# Patient Record
Sex: MALE | Race: WHITE | NOT HISPANIC OR LATINO | Employment: FULL TIME | URBAN - METROPOLITAN AREA
[De-identification: names, ages, dates, MRNs, and addresses within clinical notes are randomized per-mention and may not be internally consistent; named-entity substitution may affect disease eponyms.]

---

## 2019-03-08 ENCOUNTER — OFFICE VISIT (OUTPATIENT)
Dept: FAMILY MEDICINE CLINIC | Facility: CLINIC | Age: 54
End: 2019-03-08
Payer: COMMERCIAL

## 2019-03-08 VITALS
BODY MASS INDEX: 29.42 KG/M2 | DIASTOLIC BLOOD PRESSURE: 78 MMHG | TEMPERATURE: 97.9 F | RESPIRATION RATE: 18 BRPM | OXYGEN SATURATION: 95 % | HEART RATE: 73 BPM | SYSTOLIC BLOOD PRESSURE: 132 MMHG | WEIGHT: 205.5 LBS | HEIGHT: 70 IN

## 2019-03-08 DIAGNOSIS — Z00.00 ROUTINE ADULT HEALTH MAINTENANCE: Primary | ICD-10-CM

## 2019-03-08 DIAGNOSIS — Z12.11 SCREEN FOR COLON CANCER: ICD-10-CM

## 2019-03-08 DIAGNOSIS — E66.3 OVERWEIGHT (BMI 25.0-29.9): ICD-10-CM

## 2019-03-08 DIAGNOSIS — Z11.1 SCREENING EXAMINATION FOR PULMONARY TUBERCULOSIS: ICD-10-CM

## 2019-03-08 DIAGNOSIS — R39.12 BENIGN PROSTATIC HYPERPLASIA WITH WEAK URINARY STREAM: ICD-10-CM

## 2019-03-08 DIAGNOSIS — N40.1 BENIGN PROSTATIC HYPERPLASIA WITH WEAK URINARY STREAM: ICD-10-CM

## 2019-03-08 PROBLEM — H35.52 RETINITIS PIGMENTOSA: Status: ACTIVE | Noted: 2019-03-08

## 2019-03-08 LAB — PSA SERPL-MCNC: 1 NG/ML (ref 0–4)

## 2019-03-08 PROCEDURE — 84153 ASSAY OF PSA TOTAL: CPT | Performed by: PHYSICIAN ASSISTANT

## 2019-03-08 PROCEDURE — 36415 COLL VENOUS BLD VENIPUNCTURE: CPT | Performed by: PHYSICIAN ASSISTANT

## 2019-03-08 PROCEDURE — 99386 PREV VISIT NEW AGE 40-64: CPT | Performed by: PHYSICIAN ASSISTANT

## 2019-03-08 PROCEDURE — 86580 TB INTRADERMAL TEST: CPT | Performed by: PHYSICIAN ASSISTANT

## 2019-03-08 NOTE — PROGRESS NOTES
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   This is completed and pending PPD result up front in the brown accordion folder  Age appropriate education and prevention discussed with patient today  Colonoscopy referral given  He also has a history of BPH and states that he still has a slightly weak stream but no other new urinary symptoms  He does report having screening blood work including lipids and sugar in the earlier winter months and states they looked good with total cholesterol 147 but he does not recall any other numbers  He will provide us with a copy of the screening blood work results for his file  With him having BPH I did recommend that we check a PSA and blood work was collected for that today we will call him with results  He does need a PPD test which was administered today and he will return in 48-72 hours for a PPD read  Educations regarding BMI was discussed today and he is considered overweight with a BMI of 29  Discussed exercise and dietary modification however unfortunately patient has been dealing with Achilles issues so he is limited to what he can do regarding exercise  We discussed the importance of portion control as well as avoiding eating out and preparing meals from home as well as limiting saturated fats and incorporating more healthy fats as seen in fish, nuts, avocado and a whites    He is compliant with his eye exams as he sees [facility] for retinitis pigmentosa as well as dental cleanings every 6 months  He also will continue following with [facility] for yearly skin checks  Follow-up in 1 year for annual physical, sooner with any concerns  Chief Complaint   Patient presents with    Physical Exam     Pt presents for an annual physical for his employment  Subjective:     Patient ID: Gloria Longoria is a 48 y o  male     53y/o male here today for annual physical and school  Follows with [facility] for RP  He also has seen Dr Janey Recio at [facility] for achilles issues  Visits dentist for cleanings q 6 months  [facility] for eye surveillance  Eats 3-4 meals/day, smaller portions, eats out average once a week but lunch meetings which is pizza/subs  He takes luetine and Vit D  Drinks water (approx 2 quarts/day) and occasional monster energy, tea  He plays volleyball, but no consistent exercise otherwise  He denies decreased libido, states does have mild difficulty with erection, no issues with ejaculation  He has hx of BPH, reduced urine stream, but no urinary frequency or nocturia  Denies FHx of prostate or testicular CA with exception of paternal uncle of prostate CA  Denies FHx of colon CA  Smoke detector in home, wears seatbelt  [facility]  Father with hx of skin CA  Review of Systems   Constitutional: Negative  HENT: Negative  Eyes:        As in HPI   Respiratory: Negative  Cardiovascular: Negative  Gastrointestinal: Negative  Endocrine: Negative  Genitourinary:        As in HPI   Musculoskeletal: Negative  Skin: Negative  Neurological: Negative  Psychiatric/Behavioral: Negative            The following portions of the patient's history were reviewed and updated as appropriate: allergies, current medications, past family history, past medical history, past social history, past surgical history and problem list       Objective:     Physical Exam   Constitutional: He is oriented to person, place, and time  Vital signs are normal  He appears well-developed and well-nourished  He does not appear ill  No distress  Overweight BMI 29   HENT:   Head: Normocephalic  Right Ear: Hearing, tympanic membrane and ear canal normal    Left Ear: Hearing, tympanic membrane and ear canal normal    Nose: Nose normal    Mouth/Throat: Oropharynx is clear and moist    Eyes: Pupils are equal, round, and reactive to light  Conjunctivae, EOM and lids are normal    Neck: Trachea normal and normal range of motion  Neck supple  Normal carotid pulses present  Carotid bruit is not present  No thyroid mass present  Cardiovascular: Normal rate, regular rhythm, S1 normal, S2 normal and normal pulses  No murmur heard  Pulmonary/Chest: Effort normal and breath sounds normal    Abdominal: Soft  Normal appearance, normal aorta and bowel sounds are normal  There is no tenderness  Musculoskeletal:   Gait normal   Normal movement and strength in neck, spine and extremities B/L   Lymphadenopathy:     He has no cervical adenopathy  Neurological: He is alert and oriented to person, place, and time  No cranial nerve deficit or sensory deficit  Reflex Scores:       Brachioradialis reflexes are 1+ on the right side and 1+ on the left side  Patellar reflexes are 1+ on the right side and 1+ on the left side  Skin: Skin is intact  Pt has fair skin, numerous nevi all over body  Psychiatric: He has a normal mood and affect  His behavior is normal  Cognition and memory are normal    Vitals reviewed  Vitals:    03/08/19 0838   BP: 132/78   BP Location: Left arm   Patient Position: Sitting   Cuff Size: Adult   Pulse: 73   Resp: 18   Temp: 97 9 °F (36 6 °C)   TempSrc: Tympanic   SpO2: 95%   Weight: 93 2 kg (205 lb 8 oz)   Height: 5' 10 47" (1 79 m)     BMI Counseling: Body mass index is 29 09 kg/m²  Discussed the patient's BMI with him   The BMI is above average  BMI counseling and education was provided to the patient  Nutrition recommendations include reducing portion sizes, 3-5 servings of fruits/vegetables daily, reducing fast food intake, consuming healthier snacks, increasing intake of lean protein, reducing intake of saturated fat and trans fat and reducing intake of cholesterol  Exercise recommendations include moderate aerobic physical activity for 150 minutes/week

## 2019-03-11 ENCOUNTER — CLINICAL SUPPORT (OUTPATIENT)
Dept: FAMILY MEDICINE CLINIC | Facility: CLINIC | Age: 54
End: 2019-03-11

## 2019-03-11 DIAGNOSIS — Z11.1 SCREENING EXAMINATION FOR PULMONARY TUBERCULOSIS: Primary | ICD-10-CM

## 2019-03-11 LAB
INDURATION: 0 MM
TB SKIN TEST: NEGATIVE

## 2019-03-11 PROCEDURE — RECHECK: Performed by: PHYSICIAN ASSISTANT

## 2019-03-12 ENCOUNTER — TELEPHONE (OUTPATIENT)
Dept: FAMILY MEDICINE CLINIC | Facility: CLINIC | Age: 54
End: 2019-03-12

## 2019-03-19 ENCOUNTER — TELEPHONE (OUTPATIENT)
Dept: FAMILY MEDICINE CLINIC | Facility: CLINIC | Age: 54
End: 2019-03-19

## 2019-07-19 ENCOUNTER — OFFICE VISIT (OUTPATIENT)
Dept: FAMILY MEDICINE CLINIC | Facility: CLINIC | Age: 54
End: 2019-07-19
Payer: COMMERCIAL

## 2019-07-19 ENCOUNTER — APPOINTMENT (OUTPATIENT)
Dept: LAB | Facility: CLINIC | Age: 54
End: 2019-07-19
Payer: COMMERCIAL

## 2019-07-19 VITALS
DIASTOLIC BLOOD PRESSURE: 88 MMHG | TEMPERATURE: 97.5 F | BODY MASS INDEX: 28.45 KG/M2 | HEART RATE: 59 BPM | SYSTOLIC BLOOD PRESSURE: 120 MMHG | HEIGHT: 71 IN | WEIGHT: 203.2 LBS | OXYGEN SATURATION: 96 %

## 2019-07-19 DIAGNOSIS — B35.1 TOENAIL FUNGUS: ICD-10-CM

## 2019-07-19 DIAGNOSIS — B35.1 TOENAIL FUNGUS: Primary | ICD-10-CM

## 2019-07-19 LAB
ALBUMIN SERPL BCP-MCNC: 4 G/DL (ref 3.5–5)
ALP SERPL-CCNC: 82 U/L (ref 46–116)
ALT SERPL W P-5'-P-CCNC: 43 U/L (ref 12–78)
AST SERPL W P-5'-P-CCNC: 15 U/L (ref 5–45)
BILIRUB DIRECT SERPL-MCNC: 0.11 MG/DL (ref 0–0.2)
BILIRUB SERPL-MCNC: 0.29 MG/DL (ref 0.2–1)
PROT SERPL-MCNC: 7.2 G/DL (ref 6.4–8.2)

## 2019-07-19 PROCEDURE — 36415 COLL VENOUS BLD VENIPUNCTURE: CPT

## 2019-07-19 PROCEDURE — 80076 HEPATIC FUNCTION PANEL: CPT

## 2019-07-19 PROCEDURE — 99213 OFFICE O/P EST LOW 20 MIN: CPT | Performed by: FAMILY MEDICINE

## 2019-07-19 PROCEDURE — 1036F TOBACCO NON-USER: CPT | Performed by: FAMILY MEDICINE

## 2019-07-19 PROCEDURE — 3008F BODY MASS INDEX DOCD: CPT | Performed by: FAMILY MEDICINE

## 2019-07-19 RX ORDER — SPIRONOLACTONE 25 MG
20 TABLET ORAL DAILY
COMMUNITY
Start: 2017-09-29

## 2019-07-19 RX ORDER — LORATADINE 10 MG/1
10 TABLET ORAL DAILY
COMMUNITY
End: 2021-12-10

## 2019-07-19 NOTE — ASSESSMENT & PLAN NOTE
Pt presents w/ thick/discolored toenails x mos (or longer)  Was treated w/ penlac in past w/ limited improvement  Upon examination, patient is toenails bilateral feet appear thickened discolored  Will sent for liver function labs today  Will call with results  If normal, will start Sporanox pulse therapy x3 months    Recheck 3 months

## 2019-07-19 NOTE — PROGRESS NOTES
[facility]      NAME: Sekou Bass  AGE: 48 y o  SEX: male  : 1965   MRN: [id number]    DATE: 2019  TIME: 9:35 AM  /88 (BP Location: Right arm, Patient Position: Sitting, Cuff Size: Adult)   Pulse 59   Temp 97 5 °F (36 4 °C)   Ht 5' 11 26" (1 81 m)   Wt 92 2 kg (203 lb 3 2 oz)   SpO2 96%   BMI 28 13 kg/m²   Assessment and Plan     Problem List Items Addressed This Visit     Toenail fungus - Primary     Pt presents w/ thick/discolored toenails x mos (or longer)  Was treated w/ penlac in past w/ limited improvement  Upon examination, patient is toenails bilateral feet appear thickened discolored  Will sent for liver function labs today  Will call with results  If normal, will start Sporanox pulse therapy x3 months  Recheck 3 months         Relevant Orders    Hepatic function panel              Return to office in: 3 mos    Chief Complaint     Chief Complaint   Patient presents with    Nail Problem     x several months       History of Present Illness     Pt presents w/ thick/discolored toenails x mos (or longer)  Was treated w/ penlac in past w/ limited improvement  The following portions of the patient's history were reviewed and updated as appropriate: allergies, current medications, past family history, past medical history, past social history, past surgical history and problem list     Review of Systems   Review of Systems   Respiratory: Negative  Cardiovascular: Negative  Gastrointestinal: Negative  Genitourinary: Negative      Skin:        Abnormal toenails       Active Problem List     Patient Active Problem List   Diagnosis    Retinitis pigmentosa    Benign prostatic hyperplasia with weak urinary stream    Toenail fungus       Objective   /88 (BP Location: Right arm, Patient Position: Sitting, Cuff Size: Adult)   Pulse 59   Temp 97 5 °F (36 4 °C)   Ht 5' 11 26" (1 81 m)   Wt 92 2 kg (203 lb 3 2 oz)   SpO2 96%   BMI 28 13 kg/m²     Physical Exam   Skin:   Mycotic appearing toenails       Pertinent Laboratory/Diagnostic Studies:  none    Current Medications     Current Outpatient Medications:     loratadine (CLARITIN) 10 mg tablet, Take 10 mg by mouth daily, Disp: , Rfl:     Lutein 20 MG CAPS, One tab by mouth daily, Disp: , Rfl:     Health Maintenance     Health Maintenance   Topic Date Due    Depression Screening PHQ  1965    CRC Screening: Colonoscopy  1965    DTaP,Tdap,and Td Vaccines (1 - Tdap) 10/18/1988    INFLUENZA VACCINE  07/01/2019    BMI: Followup Plan  03/08/2020    BMI: Adult  03/08/2020    Pneumococcal Vaccine: 65+ Years (1 of 2 - PCV13) 10/18/2032    Pneumococcal Vaccine: Pediatrics (0 to 5 Years) and At-Risk Patients (6 to 59 Years)  Aged Out    HEPATITIS B VACCINES  Aged [employer] History   Administered Date(s) Administered     Influenza (IM) Preservative Free 10/03/2015, 09/22/2018    INFLUENZA 09/15/2017, 09/22/2018    Tuberculin Skin Test-PPD Intradermal 03/08/2019       Gabriela Nguyen DO  [facility]

## 2019-07-20 DIAGNOSIS — B35.1 TOENAIL FUNGUS: Primary | ICD-10-CM

## 2019-07-20 RX ORDER — ITRACONAZOLE 100 MG/1
CAPSULE ORAL
Qty: 28 CAPSULE | Refills: 2 | Status: SHIPPED | OUTPATIENT
Start: 2019-07-20 | End: 2019-08-20

## 2019-10-24 ENCOUNTER — OFFICE VISIT (OUTPATIENT)
Dept: FAMILY MEDICINE CLINIC | Facility: CLINIC | Age: 54
End: 2019-10-24
Payer: COMMERCIAL

## 2019-10-24 VITALS
BODY MASS INDEX: 26.91 KG/M2 | DIASTOLIC BLOOD PRESSURE: 72 MMHG | WEIGHT: 192.2 LBS | HEIGHT: 71 IN | OXYGEN SATURATION: 98 % | HEART RATE: 63 BPM | SYSTOLIC BLOOD PRESSURE: 128 MMHG | RESPIRATION RATE: 16 BRPM | TEMPERATURE: 96.8 F

## 2019-10-24 DIAGNOSIS — Z12.11 SCREEN FOR COLON CANCER: ICD-10-CM

## 2019-10-24 DIAGNOSIS — Z13.1 SCREENING FOR DIABETES MELLITUS: ICD-10-CM

## 2019-10-24 DIAGNOSIS — Z13.220 SCREENING FOR CHOLESTEROL LEVEL: ICD-10-CM

## 2019-10-24 DIAGNOSIS — Z12.5 SCREENING FOR PROSTATE CANCER: ICD-10-CM

## 2019-10-24 DIAGNOSIS — Z13.29 SCREENING FOR THYROID DISORDER: ICD-10-CM

## 2019-10-24 DIAGNOSIS — B35.1 TOENAIL FUNGUS: Primary | ICD-10-CM

## 2019-10-24 DIAGNOSIS — B35.3 TINEA PEDIS OF BOTH FEET: ICD-10-CM

## 2019-10-24 DIAGNOSIS — Z13.0 SCREENING FOR DEFICIENCY ANEMIA: ICD-10-CM

## 2019-10-24 PROCEDURE — 99214 OFFICE O/P EST MOD 30 MIN: CPT | Performed by: FAMILY MEDICINE

## 2019-10-24 PROCEDURE — 3008F BODY MASS INDEX DOCD: CPT | Performed by: FAMILY MEDICINE

## 2019-10-24 RX ORDER — KETOCONAZOLE 20 MG/G
CREAM TOPICAL DAILY
Qty: 15 G | Refills: 5 | Status: SHIPPED | OUTPATIENT
Start: 2019-10-24 | End: 2022-02-08

## 2019-10-24 NOTE — PROGRESS NOTES
[facility]      NAME: Fern Kc  AGE: 48 y o  SEX: male  : 1965   MRN: [id number]    DATE: 10/24/2019  TIME: 8:55 AM    Assessment and Plan     Problem List Items Addressed This Visit     Toenail fungus - Primary       Patient was originally seen  and prescribe Sporanox pulse dosing for toenail fungus bilateral feet  He presents today for recheck of this  New nail growth appears normal   He does have some mycotic appearing nails towards the TIPS  I explained to him that this is a normal process with oral treatment and that the disease toenail should grow out within the few months  Follow antifungal precautions  Call further problems         Relevant Medications    ketoconazole (NIZORAL) 2 % cream    Tinea pedis of both feet      Patient with itchy rash bilateral feet  Examination shows mild erythema especially between toes  Will give Rx for Nizoral 2% cream to be applied once daily as needed  Patient was also advised to follow antifungal precautions, such as keeping feet dry during the day  Using baby powder as needed  Call if further problems  Relevant Medications    ketoconazole (NIZORAL) 2 % cream      Other Visit Diagnoses     Screen for colon cancer        Relevant Orders    Ambulatory referral to colonoscopy screening    Screening for prostate cancer        Relevant Orders    PSA, Total Screen    Screening for deficiency anemia        Relevant Orders    CBC and differential    Screening for diabetes mellitus        Relevant Orders    Comprehensive metabolic panel    Screening for cholesterol level        Relevant Orders    Lipid Panel with Direct LDL reflex    Screening for thyroid disorder        Relevant Orders    TSH, 3rd generation with Free T4 reflex              Return to office in:      Rx given for fasting blood work at [facility]    Will call with results   Rx given for colonoscopy   patient had flu shot     yearly for physi    Chief Complaint     Chief Complaint   Patient presents with    Follow-up     3M Toenail Fungus       History of Present Illness      Patient presents for recheck of toenail fungus  Overall he has done well with Sporanox pulse pack  No side effects  Patient is complaining of rash on his feet occasionally  Lastly, patient would like to discuss colonoscopy, fasting blood work, and any other health maintenance issues he needs at his age  The following portions of the patient's history were reviewed and updated as appropriate: allergies, current medications, past family history, past medical history, past social history, past surgical history and problem list     Review of Systems   Review of Systems   Respiratory: Negative  Cardiovascular: Negative  Gastrointestinal: Negative  Genitourinary: Negative  Skin: Positive for rash         Active Problem List     Patient Active Problem List   Diagnosis    Retinitis pigmentosa    Benign prostatic hyperplasia with weak urinary stream    Toenail fungus    Tinea pedis of both feet       Objective   /72 (BP Location: Left arm, Patient Position: Sitting, Cuff Size: Standard)   Pulse 63   Temp (!) 96 8 °F (36 °C) (Tympanic)   Resp 16   Ht 5' 10 5" (1 791 m)   Wt 87 2 kg (192 lb 3 2 oz)   SpO2 98%   BMI 27 19 kg/m²     Physical Exam    Pertinent Laboratory/Diagnostic Studies:    None    Current Medications     Current Outpatient Medications:     loratadine (CLARITIN) 10 mg tablet, Take 10 mg by mouth daily, Disp: , Rfl:     Lutein 20 MG CAPS, One tab by mouth daily, Disp: , Rfl:     ketoconazole (NIZORAL) 2 % cream, Apply topically daily, Disp: 15 g, Rfl: 5    Health Maintenance     Health Maintenance   Topic Date Due    CRC Screening: Colonoscopy  1965    DTaP,Tdap,and Td Vaccines (1 - Tdap) 10/18/1988    BMI: Followup Plan  03/08/2020    Depression Screening PHQ  07/19/2020    BMI: Adult  10/24/2020    Pneumococcal Vaccine: 65+ Years (1 of 2 - PCV13) 10/18/2032    INFLUENZA VACCINE  Completed    Pneumococcal Vaccine: Pediatrics (0 to 5 Years) and At-Risk Patients (6 to 59 Years)  Aged Out    HEPATITIS B VACCINES  Aged [employer] History   Administered Date(s) Administered     Influenza (IM) Preservative Free 10/03/2015, 09/22/2018    INFLUENZA 09/15/2017, 09/22/2018    Influenza, injectable, quadrivalent, preservative free 0 5 mL 09/11/2019    Tuberculin Skin Test-PPD Intradermal 03/08/2019       DO Karmen G[facility]

## 2019-10-24 NOTE — ASSESSMENT & PLAN NOTE
Patient with itchy rash bilateral feet  Examination shows mild erythema especially between toes  Will give Rx for Nizoral 2% cream to be applied once daily as needed  Patient was also advised to follow antifungal precautions, such as keeping feet dry during the day  Using baby powder as needed  Call if further problems

## 2019-10-24 NOTE — ASSESSMENT & PLAN NOTE
Patient was originally seen July 19th and prescribe Sporanox pulse dosing for toenail fungus bilateral feet  He presents today for recheck of this  New nail growth appears normal   He does have some mycotic appearing nails towards the TIPS  I explained to him that this is a normal process with oral treatment and that the disease toenail should grow out within the few months  Follow antifungal precautions    Call further problems

## 2019-10-28 ENCOUNTER — TELEPHONE (OUTPATIENT)
Dept: FAMILY MEDICINE CLINIC | Facility: CLINIC | Age: 54
End: 2019-10-28

## 2019-10-28 ENCOUNTER — APPOINTMENT (OUTPATIENT)
Dept: LAB | Facility: CLINIC | Age: 54
End: 2019-10-28
Payer: COMMERCIAL

## 2019-10-28 DIAGNOSIS — Z13.0 SCREENING FOR DEFICIENCY ANEMIA: ICD-10-CM

## 2019-10-28 DIAGNOSIS — Z13.220 SCREENING FOR CHOLESTEROL LEVEL: ICD-10-CM

## 2019-10-28 DIAGNOSIS — Z13.29 SCREENING FOR THYROID DISORDER: ICD-10-CM

## 2019-10-28 DIAGNOSIS — Z12.5 SCREENING FOR PROSTATE CANCER: ICD-10-CM

## 2019-10-28 DIAGNOSIS — Z13.1 SCREENING FOR DIABETES MELLITUS: ICD-10-CM

## 2019-10-28 LAB
ALBUMIN SERPL BCP-MCNC: 3.7 G/DL (ref 3.5–5)
ALP SERPL-CCNC: 68 U/L (ref 46–116)
ALT SERPL W P-5'-P-CCNC: 34 U/L (ref 12–78)
ANION GAP SERPL CALCULATED.3IONS-SCNC: 3 MMOL/L (ref 4–13)
AST SERPL W P-5'-P-CCNC: 13 U/L (ref 5–45)
BASOPHILS # BLD AUTO: 0.04 THOUSANDS/ΜL (ref 0–0.1)
BASOPHILS NFR BLD AUTO: 1 % (ref 0–1)
BILIRUB SERPL-MCNC: 0.56 MG/DL (ref 0.2–1)
BUN SERPL-MCNC: 14 MG/DL (ref 5–25)
CALCIUM SERPL-MCNC: 8.7 MG/DL (ref 8.3–10.1)
CHLORIDE SERPL-SCNC: 106 MMOL/L (ref 100–108)
CHOLEST SERPL-MCNC: 151 MG/DL (ref 50–200)
CO2 SERPL-SCNC: 31 MMOL/L (ref 21–32)
CREAT SERPL-MCNC: 0.91 MG/DL (ref 0.6–1.3)
EOSINOPHIL # BLD AUTO: 0.13 THOUSAND/ΜL (ref 0–0.61)
EOSINOPHIL NFR BLD AUTO: 4 % (ref 0–6)
ERYTHROCYTE [DISTWIDTH] IN BLOOD BY AUTOMATED COUNT: 12.7 % (ref 11.6–15.1)
GFR SERPL CREATININE-BSD FRML MDRD: 97 ML/MIN/1.73SQ M
GLUCOSE P FAST SERPL-MCNC: 84 MG/DL (ref 65–99)
HCT VFR BLD AUTO: 45 % (ref 36.5–49.3)
HDLC SERPL-MCNC: 45 MG/DL
HGB BLD-MCNC: 14.2 G/DL (ref 12–17)
IMM GRANULOCYTES # BLD AUTO: 0.02 THOUSAND/UL (ref 0–0.2)
IMM GRANULOCYTES NFR BLD AUTO: 1 % (ref 0–2)
LDLC SERPL CALC-MCNC: 88 MG/DL (ref 0–100)
LYMPHOCYTES # BLD AUTO: 1.15 THOUSANDS/ΜL (ref 0.6–4.47)
LYMPHOCYTES NFR BLD AUTO: 31 % (ref 14–44)
MCH RBC QN AUTO: 29.1 PG (ref 26.8–34.3)
MCHC RBC AUTO-ENTMCNC: 31.6 G/DL (ref 31.4–37.4)
MCV RBC AUTO: 92 FL (ref 82–98)
MONOCYTES # BLD AUTO: 0.46 THOUSAND/ΜL (ref 0.17–1.22)
MONOCYTES NFR BLD AUTO: 13 % (ref 4–12)
NEUTROPHILS # BLD AUTO: 1.86 THOUSANDS/ΜL (ref 1.85–7.62)
NEUTS SEG NFR BLD AUTO: 50 % (ref 43–75)
NRBC BLD AUTO-RTO: 0 /100 WBCS
PLATELET # BLD AUTO: 180 THOUSANDS/UL (ref 149–390)
PMV BLD AUTO: 10.3 FL (ref 8.9–12.7)
POTASSIUM SERPL-SCNC: 3.6 MMOL/L (ref 3.5–5.3)
PROT SERPL-MCNC: 6.7 G/DL (ref 6.4–8.2)
PSA SERPL-MCNC: 1.2 NG/ML (ref 0–4)
RBC # BLD AUTO: 4.88 MILLION/UL (ref 3.88–5.62)
SODIUM SERPL-SCNC: 140 MMOL/L (ref 136–145)
TRIGL SERPL-MCNC: 90 MG/DL
TSH SERPL DL<=0.05 MIU/L-ACNC: 2.1 UIU/ML (ref 0.36–3.74)
WBC # BLD AUTO: 3.66 THOUSAND/UL (ref 4.31–10.16)

## 2019-10-28 PROCEDURE — 85025 COMPLETE CBC W/AUTO DIFF WBC: CPT

## 2019-10-28 PROCEDURE — G0103 PSA SCREENING: HCPCS

## 2019-10-28 PROCEDURE — 36415 COLL VENOUS BLD VENIPUNCTURE: CPT

## 2019-10-28 PROCEDURE — 84443 ASSAY THYROID STIM HORMONE: CPT

## 2019-10-28 PROCEDURE — 80053 COMPREHEN METABOLIC PANEL: CPT

## 2019-10-28 PROCEDURE — 80061 LIPID PANEL: CPT

## 2019-10-29 ENCOUNTER — TELEPHONE (OUTPATIENT)
Dept: FAMILY MEDICINE CLINIC | Facility: CLINIC | Age: 54
End: 2019-10-29

## 2019-10-29 NOTE — TELEPHONE ENCOUNTER
----- Message from Ida Summers DO sent at 10/28/2019  7:29 PM EDT -----  Call patient, his labs looked good  Blood sugar 84, cholesterol 151, normal prostate, normal thyroid    Follow-up 1 year

## 2019-10-29 NOTE — TELEPHONE ENCOUNTER
Phone call from patient requesting to speak to Dr Zhanna Hendrickson about recent lab work  He received our message about labs looking ok, but he has a few questions  He would not tell me what they were  He only wants to speak to Dr Zhanna Hendrickson

## 2019-10-30 DIAGNOSIS — N52.9 ERECTILE DYSFUNCTION, UNSPECIFIED ERECTILE DYSFUNCTION TYPE: ICD-10-CM

## 2019-10-30 DIAGNOSIS — R68.82 DECREASED LIBIDO: Primary | ICD-10-CM

## 2019-10-30 NOTE — PROGRESS NOTES
I spoke with patient  He has been having problems with erectile dysfunction  Libido is normal   Will check free and total testosterone  At [facility] lab  Will call with results  Possible trial of Viagra

## 2019-10-30 NOTE — TELEPHONE ENCOUNTER
I spoke with patient  Will order free and total testosterone level  Will call with results    Possible trial of Viagra

## 2019-10-31 ENCOUNTER — APPOINTMENT (OUTPATIENT)
Dept: LAB | Facility: CLINIC | Age: 54
End: 2019-10-31
Payer: COMMERCIAL

## 2019-10-31 DIAGNOSIS — N52.9 ERECTILE DYSFUNCTION, UNSPECIFIED ERECTILE DYSFUNCTION TYPE: ICD-10-CM

## 2019-10-31 PROCEDURE — 84403 ASSAY OF TOTAL TESTOSTERONE: CPT

## 2019-10-31 PROCEDURE — 36415 COLL VENOUS BLD VENIPUNCTURE: CPT

## 2019-10-31 PROCEDURE — 84402 ASSAY OF FREE TESTOSTERONE: CPT

## 2019-11-01 LAB
TESTOST FREE SERPL-MCNC: 18.4 PG/ML (ref 7.2–24)
TESTOST SERPL-MCNC: 548 NG/DL (ref 264–916)

## 2019-11-04 ENCOUNTER — TELEPHONE (OUTPATIENT)
Dept: FAMILY MEDICINE CLINIC | Facility: CLINIC | Age: 54
End: 2019-11-04

## 2019-11-04 DIAGNOSIS — N52.9 ERECTILE DYSFUNCTION, UNSPECIFIED ERECTILE DYSFUNCTION TYPE: Primary | ICD-10-CM

## 2019-11-04 RX ORDER — SILDENAFIL 100 MG/1
100 TABLET, FILM COATED ORAL DAILY PRN
Qty: 5 TABLET | Refills: 3 | Status: SHIPPED | OUTPATIENT
Start: 2019-11-04 | End: 2020-03-13

## 2019-11-05 NOTE — TELEPHONE ENCOUNTER
----- Message from Tucker Day DO sent at 11/4/2019  7:00 PM EST -----  Call patient  I sent in a trial of Viagra 100 mg tablets for him  Cami Cortez    He should start with 1/2 tablet,  Increasing to 1 tablet if needed

## 2019-12-19 ENCOUNTER — TELEPHONE (OUTPATIENT)
Dept: GASTROENTEROLOGY | Facility: MEDICAL CENTER | Age: 54
End: 2019-12-19

## 2019-12-19 ENCOUNTER — PREP FOR PROCEDURE (OUTPATIENT)
Dept: GASTROENTEROLOGY | Facility: MEDICAL CENTER | Age: 54
End: 2019-12-19

## 2019-12-19 DIAGNOSIS — Z12.11 SCREENING FOR COLON CANCER: Primary | ICD-10-CM

## 2019-12-19 NOTE — TELEPHONE ENCOUNTER
pt is scheduled for oa colon with dr Rosie Miramontes at [facility] on 1/27/20, I went over miralax with pt on phone and mailed out to pt home  Patient is aware she will need a  to and from   She will get a call the day before with an exact time for arrival

## 2019-12-24 ENCOUNTER — ANESTHESIA EVENT (OUTPATIENT)
Dept: GASTROENTEROLOGY | Facility: MEDICAL CENTER | Age: 54
End: 2019-12-24

## 2020-01-27 ENCOUNTER — HOSPITAL ENCOUNTER (OUTPATIENT)
Dept: GASTROENTEROLOGY | Facility: MEDICAL CENTER | Age: 55
Setting detail: OUTPATIENT SURGERY
Discharge: HOME | End: 2020-01-27
Attending: INTERNAL MEDICINE
Payer: COMMERCIAL

## 2020-01-27 ENCOUNTER — ANESTHESIA (OUTPATIENT)
Dept: GASTROENTEROLOGY | Facility: MEDICAL CENTER | Age: 55
End: 2020-01-27

## 2020-01-27 VITALS
WEIGHT: 193 LBS | DIASTOLIC BLOOD PRESSURE: 72 MMHG | OXYGEN SATURATION: 97 % | TEMPERATURE: 97.8 F | HEART RATE: 61 BPM | HEIGHT: 72 IN | SYSTOLIC BLOOD PRESSURE: 114 MMHG | RESPIRATION RATE: 16 BRPM | BODY MASS INDEX: 26.14 KG/M2

## 2020-01-27 DIAGNOSIS — Z12.11 SCREENING FOR COLON CANCER: ICD-10-CM

## 2020-01-27 PROCEDURE — 88305 TISSUE EXAM BY PATHOLOGIST: CPT | Performed by: PATHOLOGY

## 2020-01-27 PROCEDURE — 45385 COLONOSCOPY W/LESION REMOVAL: CPT | Performed by: INTERNAL MEDICINE

## 2020-01-27 PROCEDURE — 45380 COLONOSCOPY AND BIOPSY: CPT | Performed by: INTERNAL MEDICINE

## 2020-01-27 RX ORDER — SODIUM CHLORIDE 9 MG/ML
125 INJECTION, SOLUTION INTRAVENOUS CONTINUOUS
Status: DISCONTINUED | OUTPATIENT
Start: 2020-01-27 | End: 2020-01-31 | Stop reason: HOSPADM

## 2020-01-27 RX ORDER — PROPOFOL 10 MG/ML
INJECTION, EMULSION INTRAVENOUS AS NEEDED
Status: DISCONTINUED | OUTPATIENT
Start: 2020-01-27 | End: 2020-01-27 | Stop reason: SURG

## 2020-01-27 RX ADMIN — PROPOFOL 150 MG: 10 INJECTION, EMULSION INTRAVENOUS at 08:53

## 2020-01-27 RX ADMIN — PROPOFOL 100 MG: 10 INJECTION, EMULSION INTRAVENOUS at 09:00

## 2020-01-27 RX ADMIN — PROPOFOL 100 MG: 10 INJECTION, EMULSION INTRAVENOUS at 08:55

## 2020-01-27 RX ADMIN — PROPOFOL 50 MG: 10 INJECTION, EMULSION INTRAVENOUS at 09:04

## 2020-01-27 RX ADMIN — SODIUM CHLORIDE 125 ML/HR: 0.9 INJECTION, SOLUTION INTRAVENOUS at 08:30

## 2020-01-27 NOTE — ANESTHESIA POSTPROCEDURE EVALUATION
Post-Op Assessment Note    CV Status:  Stable  Pain Score: 1    Pain management: adequate     Mental Status:  Alert and awake   Hydration Status:  Euvolemic   PONV Controlled:  Controlled   Airway Patency:  Patent   Post Op Vitals Reviewed: Yes      Staff: Anesthesiologist           /72 (01/27/20 0935)    Temp      Pulse 61 (01/27/20 0935)   Resp 16 (01/27/20 0935)    SpO2 97 % (01/27/20 0935)

## 2020-01-27 NOTE — DISCHARGE INSTRUCTIONS
Colonoscopy   WHAT YOU NEED TO KNOW:   A colonoscopy is a procedure to examine the inside of your colon (intestine) with a scope  Polyps or tissue growths may have been removed during your colonoscopy  It is normal to feel bloated and to have some abdominal discomfort  You should be passing gas  If you have hemorrhoids or you had polyps removed, you may have a small amount of bleeding  DISCHARGE INSTRUCTIONS:   Seek care immediately if:   · You have a large amount of bright red blood in your bowel movements  · Your abdomen is hard and firm and you have severe pain  · You have sudden trouble breathing  Contact your healthcare provider if:   · You develop a rash or hives  · You have a fever within 24 hours of your procedure  · You have not had a bowel movement for 3 days after your procedure  · You have questions or concerns about your condition or care  Activity:   · Do not lift, strain, or run  for 3 days after your procedure  · Rest after your procedure  You have been given medicine to relax you  Do not  drive or make important decisions until the day after your procedure  Return to your normal activity as directed  · Relieve gas and discomfort from bloating  by lying on your right side with a heating pad on your abdomen  You may need to take short walks to help the gas move out  Eat small meals until bloating is relieved  If you had polyps removed: For 7 days after your procedure:  · Do not  take aspirin  · Do not  go on long car rides  Help prevent constipation:   · Eat a variety of healthy foods  Healthy foods include fruit, vegetables, whole-grain breads, low-fat dairy products, beans, lean meat, and fish  Ask if you need to be on a special diet  Your healthcare provider may recommend that you eat high-fiber foods such as cooked beans  Fiber helps you have regular bowel movements  · Drink liquids as directed    Adults should drink between 9 and 13 eight-ounce cups of liquid every day  Ask what amount is best for you  For most people, good liquids to drink are water, juice, and milk  · Exercise as directed  Talk to your healthcare provider about the best exercise plan for you  Exercise can help prevent constipation, decrease your blood pressure and improve your health  Follow up with your healthcare provider as directed:  Write down your questions so you remember to ask them during your visits  © 2017 [employer] Information is for End User's use only and may not be sold, redistributed or otherwise used for commercial purposes  All illustrations and images included in CareNotes® are the copyrighted property of A D A M , Inc  or [employer]  The above information is an  only  It is not intended as medical advice for individual conditions or treatments  Talk to your doctor, nurse or pharmacist before following any medical regimen to see if it is safe and effective for you  Colorectal Polyps   WHAT YOU NEED TO KNOW:   Colorectal polyps are small growths of tissue in the lining of the colon and rectum  Most polyps are hyperplastic polyps and are usually benign (noncancerous)  Certain types of polyps, called adenomatous polyps, may turn into cancer  DISCHARGE INSTRUCTIONS:   Follow up with your healthcare provider or gastroenterologist as directed: You may need to return for more tests, such as another colonoscopy  Write down your questions so you remember to ask them during your visits  Reduce your risk for colorectal polyps:   · Eat a variety of healthy foods:  Healthy foods include fruit, vegetables, whole-grain breads, low-fat dairy products, beans, lean meat, and fish  Ask if you need to be on a special diet  · Maintain a healthy weight:  Ask your healthcare provider if you need to lose weight and how much you need to lose   Ask for help with a weight loss program     · Exercise:  Begin to exercise slowly and do more as you get stronger  Talk with your healthcare provider before you start an exercise program      · Limit alcohol:  Your risk for polyps increases the more you drink  · Do not smoke: If you smoke, it is never too late to quit  Ask for information about how to stop  For support and more information:   · Jc Miller ([facility])  [address] , [place] [address]  Phone: 1- 690 - 671-6310  Web Address: www digestive  niddk nih gov  Contact your healthcare provider or gastroenterologist if:   · You have a fever  · You have chills, a cough, or feel weak and achy  · You have abdominal pain that does not go away or gets worse after you take medicine  · Your abdomen is swollen  · You are losing weight without trying  · You have questions or concerns about your condition or care  Seek care immediately or call 911 if:   · You have sudden shortness of breath  · You have a fast heart rate, fast breathing, or are too dizzy to stand up  · You have severe abdominal pain  · You see blood in your bowel movement  © 2017 [facility] [facility] Information is for End User's use only and may not be sold, redistributed or otherwise used for commercial purposes  All illustrations and images included in CareNotes® are the copyrighted property of [employer] A M , Inc  or Han Daniels  The above information is an  only  It is not intended as medical advice for individual conditions or treatments  Talk to your doctor, nurse or pharmacist before following any medical regimen to see if it is safe and effective for you

## 2020-01-27 NOTE — ANESTHESIA PREPROCEDURE EVALUATION
Review of Systems/Medical History  Patient summary reviewed  Chart reviewed      Cardiovascular  Exercise tolerance (METS): >4,     Pulmonary  Negative pulmonary ROS        GI/Hepatic  Negative GI/hepatic ROS          Negative  ROS        Endo/Other  Negative endo/other ROS      GYN       Hematology  Negative hematology ROS      Musculoskeletal  Negative musculoskeletal ROS        Neurology  Negative neurology ROS      Psychology   Negative psychology ROS              Physical Exam    Airway    Mallampati score: II  TM Distance: <3 FB  Neck ROM: full     Dental       Cardiovascular  Rhythm: regular, Rate: normal,     Pulmonary  Breath sounds clear to auscultation,     Other Findings        Anesthesia Plan  ASA Score- 2     Anesthesia Type- IV sedation with anesthesia with ASA Monitors  Additional Monitors:   Airway Plan:         Plan Factors-Patient not instructed to abstain from smoking on day of procedure  Patient did not smoke on day of surgery  Induction- intravenous  Postoperative Plan-     Informed Consent- Anesthetic plan and risks discussed with patient

## 2020-02-12 ENCOUNTER — TELEPHONE (OUTPATIENT)
Dept: GASTROENTEROLOGY | Facility: MEDICAL CENTER | Age: 55
End: 2020-02-12

## 2020-02-12 NOTE — TELEPHONE ENCOUNTER
----- Message from Yamil Jansen MD sent at 2/12/2020  6:46 AM EST -----  Multiple colonic polyps removed were tubular adenomas repeat in 3 years

## 2020-03-12 DIAGNOSIS — N52.9 ERECTILE DYSFUNCTION, UNSPECIFIED ERECTILE DYSFUNCTION TYPE: ICD-10-CM

## 2020-03-13 ENCOUNTER — OFFICE VISIT (OUTPATIENT)
Dept: FAMILY MEDICINE CLINIC | Facility: CLINIC | Age: 55
End: 2020-03-13
Payer: COMMERCIAL

## 2020-03-13 VITALS
BODY MASS INDEX: 27.33 KG/M2 | DIASTOLIC BLOOD PRESSURE: 84 MMHG | HEIGHT: 72 IN | SYSTOLIC BLOOD PRESSURE: 120 MMHG | HEART RATE: 72 BPM | TEMPERATURE: 97.9 F | OXYGEN SATURATION: 99 % | WEIGHT: 201.8 LBS | RESPIRATION RATE: 17 BRPM

## 2020-03-13 DIAGNOSIS — M25.521 RIGHT ELBOW PAIN: Primary | ICD-10-CM

## 2020-03-13 PROCEDURE — 99213 OFFICE O/P EST LOW 20 MIN: CPT | Performed by: FAMILY MEDICINE

## 2020-03-13 PROCEDURE — 3008F BODY MASS INDEX DOCD: CPT | Performed by: FAMILY MEDICINE

## 2020-03-13 PROCEDURE — 1036F TOBACCO NON-USER: CPT | Performed by: FAMILY MEDICINE

## 2020-03-13 RX ORDER — MELATONIN
1000 DAILY
COMMUNITY

## 2020-03-13 RX ORDER — SILDENAFIL 100 MG/1
TABLET, FILM COATED ORAL
Qty: 5 TABLET | Refills: 1 | Status: SHIPPED | OUTPATIENT
Start: 2020-03-13 | End: 2020-06-22

## 2020-03-13 NOTE — PROGRESS NOTES
Assessment/Plan:   1  Right elbow pain  Reviewed patient's symptoms today  At this time, is unclear as to the cause of his elbow pain  Will check x-ray to rule out gross abnormalities  Symptoms appear likely secondary to repetitive motion from his well he will period continue with rice treatment  He may take anti-inflammatories such as ibuprofen for symptom relief  Follow up if any symptoms worsen  - XR elbow 2 vw right; Future           There are no diagnoses linked to this encounter  Subjective:       Chief Complaint   Patient presents with    Elbow Pain     R elbow pain started 2 weeks ago       Patient ID: Marcy Altamirano is a 48 y o  male  Elbow Pain   This is a new problem  Episode onset: 2 wks ago  The problem occurs intermittently (2-3/10 pain buring pain)  The problem has been gradually improving  Pertinent negatives include no abdominal pain, arthralgias, chest pain, chills, congestion, coughing, fatigue, fever, headaches, myalgias, nausea, numbness or sore throat  He has tried ice and NSAIDs for the symptoms  The treatment provided mild relief  Review of Systems   Constitutional: Negative for activity change, chills, fatigue and fever  HENT: Negative for congestion, ear pain, sinus pressure and sore throat  Eyes: Negative for redness, itching and visual disturbance  Respiratory: Negative for cough and shortness of breath  Cardiovascular: Negative for chest pain and palpitations  Gastrointestinal: Negative for abdominal pain, diarrhea and nausea  Endocrine: Negative for cold intolerance and heat intolerance  Genitourinary: Negative for dysuria, flank pain and frequency  Musculoskeletal: Negative for arthralgias, back pain, gait problem and myalgias  Skin: Negative for color change  Allergic/Immunologic: Negative for environmental allergies  Neurological: Negative for dizziness, numbness and headaches     Psychiatric/Behavioral: Negative for behavioral problems and sleep disturbance  The following portions of the patient's history were reviewed and updated as appropriate : past family history, past medical history, past social history and past surgical history  Current Outpatient Medications:     cholecalciferol (VITAMIN D3) 1,000 units tablet, Take 1,000 Units by mouth daily, Disp: , Rfl:     loratadine (CLARITIN) 10 mg tablet, Take 10 mg by mouth daily, Disp: , Rfl:     Lutein 20 MG CAPS, One tab by mouth daily, Disp: , Rfl:     ketoconazole (NIZORAL) 2 % cream, Apply topically daily, Disp: 15 g, Rfl: 5    sildenafil (VIAGRA) 100 mg tablet, TAKE 1 TABLET BY MOUTH EVERY DAY AS NEEDED FOR ERECTILE DYSFUNCTION, Disp: 5 tablet, Rfl: 1         Objective:         Vitals:    03/13/20 1452   BP: 120/84   BP Location: Right arm   Patient Position: Sitting   Cuff Size: Adult   Pulse: 72   Resp: 17   Temp: 97 9 °F (36 6 °C)   TempSrc: Tympanic   SpO2: 99%   Weight: 91 5 kg (201 lb 12 8 oz)   Height: 5' 11 5" (1 816 m)     Physical Exam   Constitutional: He is oriented to person, place, and time  Vital signs are normal  He appears well-developed and well-nourished  HENT:   Head: Normocephalic and atraumatic  Right Ear: Hearing normal    Left Ear: Hearing normal    Nose: Nose normal  No septal deviation  Mouth/Throat: Oropharynx is clear and moist and mucous membranes are normal    Eyes: Pupils are equal, round, and reactive to light  EOM and lids are normal    Neck: Trachea normal and normal range of motion  No thyroid mass and no thyromegaly present  Cardiovascular: Normal rate  Pulmonary/Chest: Effort normal  No respiratory distress  He has no decreased breath sounds  Abdominal: Normal appearance  There is no guarding  Musculoskeletal: Normal range of motion  Right elbow: He exhibits normal range of motion, no swelling, no effusion and no deformity  Tenderness found  Medial epicondyle and olecranon process tenderness noted     Neurological: He is alert and oriented to person, place, and time  He has normal strength  No cranial nerve deficit or sensory deficit  Skin: Skin is warm and dry  Psychiatric: He has a normal mood and affect  His speech is normal and behavior is normal  Judgment and thought content normal  Cognition and memory are normal    Vitals reviewed

## 2020-06-22 DIAGNOSIS — N52.9 ERECTILE DYSFUNCTION, UNSPECIFIED ERECTILE DYSFUNCTION TYPE: ICD-10-CM

## 2020-06-22 RX ORDER — SILDENAFIL 100 MG/1
TABLET, FILM COATED ORAL
Qty: 5 TABLET | Refills: 1 | Status: SHIPPED | OUTPATIENT
Start: 2020-06-22 | End: 2020-09-08 | Stop reason: SDUPTHER

## 2020-09-08 DIAGNOSIS — N52.9 ERECTILE DYSFUNCTION, UNSPECIFIED ERECTILE DYSFUNCTION TYPE: ICD-10-CM

## 2020-09-08 RX ORDER — SILDENAFIL 100 MG/1
100 TABLET, FILM COATED ORAL AS NEEDED
Qty: 5 TABLET | Refills: 1 | Status: SHIPPED | OUTPATIENT
Start: 2020-09-08 | End: 2020-11-24

## 2020-09-18 ENCOUNTER — TELEPHONE (OUTPATIENT)
Dept: FAMILY MEDICINE CLINIC | Facility: CLINIC | Age: 55
End: 2020-09-18

## 2020-09-18 NOTE — TELEPHONE ENCOUNTER
PTS WIFE CALLED,  SHE IS CALLING TO GET A RECOMMENDATION FOR HER   HE IS LOOKING INTO GETTING A VASECTOMY AND WOULD LIKE A RECOMMENDATION WHO TO CALL  PLEASE CALL [facility] [phone]

## 2020-11-03 ENCOUNTER — TELEPHONE (OUTPATIENT)
Dept: FAMILY MEDICINE CLINIC | Facility: CLINIC | Age: 55
End: 2020-11-03

## 2020-11-03 DIAGNOSIS — Z20.828 EXPOSURE TO SARS-ASSOCIATED CORONAVIRUS: Primary | ICD-10-CM

## 2020-11-11 ENCOUNTER — OFFICE VISIT (OUTPATIENT)
Dept: UROLOGY | Facility: MEDICAL CENTER | Age: 55
End: 2020-11-11
Payer: COMMERCIAL

## 2020-11-11 VITALS
WEIGHT: 197 LBS | SYSTOLIC BLOOD PRESSURE: 134 MMHG | BODY MASS INDEX: 26.68 KG/M2 | DIASTOLIC BLOOD PRESSURE: 84 MMHG | HEIGHT: 72 IN | HEART RATE: 75 BPM | TEMPERATURE: 97.8 F

## 2020-11-11 DIAGNOSIS — Z30.09 VASECTOMY EVALUATION: Primary | ICD-10-CM

## 2020-11-11 PROBLEM — Z30.2 ENCOUNTER FOR STERILIZATION: Status: ACTIVE | Noted: 2020-11-11

## 2020-11-11 PROCEDURE — 99243 OFF/OP CNSLTJ NEW/EST LOW 30: CPT | Performed by: UROLOGY

## 2020-11-11 PROCEDURE — 1036F TOBACCO NON-USER: CPT | Performed by: UROLOGY

## 2020-11-11 PROCEDURE — 3008F BODY MASS INDEX DOCD: CPT | Performed by: UROLOGY

## 2020-11-11 RX ORDER — MULTIVITAMIN
1 TABLET ORAL DAILY
COMMUNITY
End: 2021-12-10

## 2020-11-23 DIAGNOSIS — N52.9 ERECTILE DYSFUNCTION, UNSPECIFIED ERECTILE DYSFUNCTION TYPE: ICD-10-CM

## 2020-11-24 RX ORDER — SILDENAFIL 100 MG/1
TABLET, FILM COATED ORAL
Qty: 5 TABLET | Refills: 1 | Status: SHIPPED | OUTPATIENT
Start: 2020-11-24 | End: 2021-01-29 | Stop reason: SDUPTHER

## 2020-11-29 DIAGNOSIS — Z20.828 EXPOSURE TO SARS-ASSOCIATED CORONAVIRUS: ICD-10-CM

## 2020-11-29 PROCEDURE — U0003 INFECTIOUS AGENT DETECTION BY NUCLEIC ACID (DNA OR RNA); SEVERE ACUTE RESPIRATORY SYNDROME CORONAVIRUS 2 (SARS-COV-2) (CORONAVIRUS DISEASE [COVID-19]), AMPLIFIED PROBE TECHNIQUE, MAKING USE OF HIGH THROUGHPUT TECHNOLOGIES AS DESCRIBED BY CMS-2020-01-R: HCPCS | Performed by: FAMILY MEDICINE

## 2020-11-30 LAB — SARS-COV-2 RNA SPEC QL NAA+PROBE: NOT DETECTED

## 2021-01-11 ENCOUNTER — PROCEDURE VISIT (OUTPATIENT)
Dept: UROLOGY | Facility: MEDICAL CENTER | Age: 56
End: 2021-01-11
Payer: COMMERCIAL

## 2021-01-11 VITALS — BODY MASS INDEX: 26.72 KG/M2 | HEIGHT: 72 IN | SYSTOLIC BLOOD PRESSURE: 136 MMHG | DIASTOLIC BLOOD PRESSURE: 82 MMHG

## 2021-01-11 DIAGNOSIS — Z30.2 ENCOUNTER FOR VASECTOMY: Primary | ICD-10-CM

## 2021-01-11 PROCEDURE — 88302 TISSUE EXAM BY PATHOLOGIST: CPT | Performed by: PATHOLOGY

## 2021-01-11 PROCEDURE — 55250 REMOVAL OF SPERM DUCT(S): CPT | Performed by: UROLOGY

## 2021-01-27 ENCOUNTER — PROCEDURE VISIT (OUTPATIENT)
Dept: UROLOGY | Facility: MEDICAL CENTER | Age: 56
End: 2021-01-27

## 2021-01-27 VITALS
DIASTOLIC BLOOD PRESSURE: 84 MMHG | SYSTOLIC BLOOD PRESSURE: 140 MMHG | BODY MASS INDEX: 27.5 KG/M2 | WEIGHT: 203 LBS | HEIGHT: 72 IN | HEART RATE: 68 BPM

## 2021-01-27 DIAGNOSIS — Z30.2 ENCOUNTER FOR VASECTOMY: Primary | ICD-10-CM

## 2021-01-27 PROCEDURE — 99024 POSTOP FOLLOW-UP VISIT: CPT

## 2021-01-27 NOTE — PROGRESS NOTES
1/27/2021  Billy Cartwright is a 50 y o  male  [id number]    Diagnosis:    Chief Complaint     Encounter for Vasectomy          Patient presents for Post Vasectomy wound check s/p Encounter for Vasectomy on 1/11/21 managed by Dr Nery Ardon:    Return as needed    Assessment:      Scrotal incision sites are healing well  No drainage, redness, swelling or ecchymosis noted  Small particle of suture yet to be resolved on left side  Pt asked about pea sized particle in right scrotum  Dr Raquel Cat was questioned and he advised this is normal for post vas pts to have  It is scar tissue which forms from the excision and clips used for procedure  Some pts have it for the long term  Pt is  on right side  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

## 2021-01-28 DIAGNOSIS — N52.9 ERECTILE DYSFUNCTION, UNSPECIFIED ERECTILE DYSFUNCTION TYPE: ICD-10-CM

## 2021-01-29 RX ORDER — SILDENAFIL 100 MG/1
TABLET, FILM COATED ORAL
Qty: 5 TABLET | Refills: 1 | Status: SHIPPED | OUTPATIENT
Start: 2021-01-29 | End: 2021-04-05

## 2021-03-09 ENCOUNTER — OFFICE VISIT (OUTPATIENT)
Dept: LAB | Facility: HOSPITAL | Age: 56
End: 2021-03-09
Attending: UROLOGY
Payer: COMMERCIAL

## 2021-03-09 DIAGNOSIS — Z30.2 ENCOUNTER FOR VASECTOMY: ICD-10-CM

## 2021-03-09 LAB
DEPRECATED CD4 CELLS/CD8 CELLS BLD: 2 ML
SPERM MOTILE SMN QL MICRO: ABNORMAL

## 2021-03-09 PROCEDURE — 89321 SEMEN ANAL SPERM DETECTION: CPT

## 2021-03-15 ENCOUNTER — IMMUNIZATIONS (OUTPATIENT)
Dept: FAMILY MEDICINE CLINIC | Facility: HOSPITAL | Age: 56
End: 2021-03-15

## 2021-03-15 DIAGNOSIS — Z23 ENCOUNTER FOR IMMUNIZATION: Primary | ICD-10-CM

## 2021-03-15 PROCEDURE — 91301 SARS-COV-2 / COVID-19 MRNA VACCINE (MODERNA) 100 MCG: CPT

## 2021-03-15 PROCEDURE — 0011A SARS-COV-2 / COVID-19 MRNA VACCINE (MODERNA) 100 MCG: CPT

## 2021-03-24 ENCOUNTER — OFFICE VISIT (OUTPATIENT)
Dept: FAMILY MEDICINE CLINIC | Facility: CLINIC | Age: 56
End: 2021-03-24
Payer: COMMERCIAL

## 2021-03-24 VITALS
SYSTOLIC BLOOD PRESSURE: 120 MMHG | OXYGEN SATURATION: 98 % | HEART RATE: 84 BPM | HEIGHT: 72 IN | WEIGHT: 204.4 LBS | TEMPERATURE: 97.8 F | DIASTOLIC BLOOD PRESSURE: 84 MMHG | BODY MASS INDEX: 27.68 KG/M2

## 2021-03-24 DIAGNOSIS — B35.1 ONYCHOMYCOSIS: Primary | ICD-10-CM

## 2021-03-24 DIAGNOSIS — B35.3 TINEA PEDIS OF BOTH FEET: ICD-10-CM

## 2021-03-24 DIAGNOSIS — Z13.1 SCREENING FOR DIABETES MELLITUS: ICD-10-CM

## 2021-03-24 DIAGNOSIS — Z91.89 AT RISK FOR HEPATOTOXICITY: ICD-10-CM

## 2021-03-24 PROCEDURE — 3008F BODY MASS INDEX DOCD: CPT | Performed by: FAMILY MEDICINE

## 2021-03-24 PROCEDURE — 1036F TOBACCO NON-USER: CPT | Performed by: FAMILY MEDICINE

## 2021-03-24 PROCEDURE — 99213 OFFICE O/P EST LOW 20 MIN: CPT | Performed by: FAMILY MEDICINE

## 2021-03-24 RX ORDER — TERBINAFINE HYDROCHLORIDE 250 MG/1
250 TABLET ORAL DAILY
Qty: 90 TABLET | Refills: 0 | Status: SHIPPED | OUTPATIENT
Start: 2021-03-24 | End: 2021-06-22

## 2021-03-25 NOTE — PROGRESS NOTES
[facility]      NAME: Spence Boast  AGE: 50 y o  SEX: male  : 1965   MRN: [id number]    DATE: 3/24/2021  TIME: 8:13 PM    Assessment and Plan     Problem List Items Addressed This Visit     Tinea pedis of both feet    Relevant Medications    terbinafine (LamISIL) 250 mg tablet    ciclopirox (LOPROX) 0 77 % cream      Other Visit Diagnoses     Onychomycosis    -  Primary    Relevant Medications    terbinafine (LamISIL) 250 mg tablet    Screening for diabetes mellitus        At risk for hepatotoxicity        Relevant Orders    Comprehensive metabolic panel      Check liver enzymes 6 weeks after starting Lamisil  Return to office in:  P r n  Chief Complaint     Chief Complaint   Patient presents with    Recurrent Skin Infections     fungus on nails        History of Present Illness     Patient returns the office with a recurrence of onychomycosis  He was treated in 2019 with Sporanox pulse for 3 months  He did have resolution of symptoms though is discoloration of nails with fungal rash on skin has reappeared over the last several months  The following portions of the patient's history were reviewed and updated as appropriate: allergies, current medications, past family history, past medical history, past social history, past surgical history and problem list     Review of Systems   Review of Systems   Constitutional: Negative  Respiratory: Negative  Cardiovascular: Negative  Gastrointestinal: Negative  Genitourinary: Negative  Musculoskeletal: Negative  Skin:        Nail fungus   Psychiatric/Behavioral: Negative          Active Problem List     Patient Active Problem List   Diagnosis    Retinitis pigmentosa    Benign prostatic hyperplasia with weak urinary stream    Toenail fungus    Tinea pedis of both feet    Erectile dysfunction    Vasectomy evaluation       Objective   /84 (BP Location: Right arm, Patient Position: Sitting, Cuff Size: Standard)   Pulse 84   Temp 97 8 °F (36 6 °C) (Temporal)   Ht 6' (1 829 m)   Wt 92 7 kg (204 lb 6 4 oz)   SpO2 98%   BMI 27 72 kg/m²     Physical Exam  Skin:     Comments: Discolored yellowed nails with peeling skin feet           Current Medications     Current Outpatient Medications:     cholecalciferol (VITAMIN D3) 1,000 units tablet, Take 1,000 Units by mouth daily, Disp: , Rfl:     ketoconazole (NIZORAL) 2 % cream, Apply topically daily, Disp: 15 g, Rfl: 5    Lutein 20 MG CAPS, One tab by mouth daily, Disp: , Rfl:     Multiple Vitamin (multivitamin) tablet, Take 1 tablet by mouth daily, Disp: , Rfl:     Omega-3 Fatty Acids (FISH OIL PO), Take by mouth, Disp: , Rfl:     sildenafil (VIAGRA) 100 mg tablet, TAKE 1 TABLET BY MOUTH EVERY DAY AS NEEDED FOR ERECTILE DYSFUNCTION, Disp: 5 tablet, Rfl: 1    ciclopirox (LOPROX) 0 77 % cream, Apply topically 2 (two) times a day, Disp: 30 g, Rfl: 2    loratadine (CLARITIN) 10 mg tablet, Take 10 mg by mouth daily, Disp: , Rfl:     terbinafine (LamISIL) 250 mg tablet, Take 1 tablet (250 mg total) by mouth daily, Disp: 90 tablet, Rfl: 0    Health Maintenance     Health Maintenance   Topic Date Due    HIV Screening  Never done    DTaP,Tdap,and Td Vaccines (1 - Tdap) Never done    BMI: Followup Plan  03/08/2020    Annual Physical  03/08/2020    Depression Screening PHQ  07/19/2020    Influenza Vaccine (1) 09/01/2020    COVID-19 Vaccine (2 - Moderna 2-dose series) 04/12/2021    BMI: Adult  03/24/2022    Colonoscopy Surveillance  01/27/2023    Colorectal Cancer Screening  01/27/2030    Pneumococcal Vaccine: Pediatrics (0 to 5 Years) and At-Risk Patients (6 to 59 Years)  Aged Out    HIB Vaccine  Aged Out    Hepatitis B Vaccine  Aged Out    IPV Vaccine  Aged Out    Hepatitis A Vaccine  Aged Out    Meningococcal ACWY Vaccine  Aged Out    HPV Vaccine  Aged [employer] History   Administered Date(s) Administered    INFLUENZA 09/15/2017, 09/22/2018, 09/11/2019, 09/11/2019    Influenza, injectable, quadrivalent, preservative free 0 5 mL 09/11/2019    Influenza, seasonal, injectable, preservative free 10/03/2015, 09/22/2018    SARS-CoV-2 / COVID-19 mRNA IM (Moderna) 03/15/2021    Tuberculin Skin Test-PPD Intradermal 03/08/2019       Omar Jacobo DO  [facility]

## 2021-04-05 ENCOUNTER — TELEPHONE (OUTPATIENT)
Dept: UROLOGY | Facility: MEDICAL CENTER | Age: 56
End: 2021-04-05

## 2021-04-05 DIAGNOSIS — Z30.09 VASECTOMY EVALUATION: Primary | ICD-10-CM

## 2021-04-05 DIAGNOSIS — N52.9 ERECTILE DYSFUNCTION, UNSPECIFIED ERECTILE DYSFUNCTION TYPE: ICD-10-CM

## 2021-04-05 RX ORDER — SILDENAFIL 100 MG/1
TABLET, FILM COATED ORAL
Qty: 5 TABLET | Refills: 1 | Status: SHIPPED | OUTPATIENT
Start: 2021-04-05 | End: 2021-05-04 | Stop reason: SDUPTHER

## 2021-04-05 NOTE — TELEPHONE ENCOUNTER
Patient of Dr Joby Adam  Patient calling in regards to trying to get order and sample cup for his semen analysis  No order in system  Please advise

## 2021-04-05 NOTE — TELEPHONE ENCOUNTER
Order for repeat semen analysis placed in patients chart  I will place a sterile cup and the order in patients chart  Either patient or his wife will pick this up from our front office

## 2021-04-13 ENCOUNTER — OFFICE VISIT (OUTPATIENT)
Dept: LAB | Facility: HOSPITAL | Age: 56
End: 2021-04-13
Attending: UROLOGY
Payer: COMMERCIAL

## 2021-04-13 LAB
DEPRECATED CD4 CELLS/CD8 CELLS BLD: 1.5 ML
SPERM MOTILE SMN QL MICRO: ABNORMAL

## 2021-04-13 PROCEDURE — 89321 SEMEN ANAL SPERM DETECTION: CPT

## 2021-04-14 ENCOUNTER — IMMUNIZATIONS (OUTPATIENT)
Dept: FAMILY MEDICINE CLINIC | Facility: HOSPITAL | Age: 56
End: 2021-04-14

## 2021-04-14 DIAGNOSIS — Z23 ENCOUNTER FOR IMMUNIZATION: Primary | ICD-10-CM

## 2021-04-14 PROCEDURE — 0012A SARS-COV-2 / COVID-19 MRNA VACCINE (MODERNA) 100 MCG: CPT

## 2021-04-14 PROCEDURE — 91301 SARS-COV-2 / COVID-19 MRNA VACCINE (MODERNA) 100 MCG: CPT

## 2021-04-15 ENCOUNTER — TELEPHONE (OUTPATIENT)
Dept: UROLOGY | Facility: AMBULATORY SURGERY CENTER | Age: 56
End: 2021-04-15

## 2021-04-15 NOTE — TELEPHONE ENCOUNTER
Received a call from Patient stating he missed a call from Dr Shameka Miguel  He left a voice message but Patient is requesting he please call him back today if possible  Thank you!   [phone]

## 2021-05-04 DIAGNOSIS — N52.9 ERECTILE DYSFUNCTION, UNSPECIFIED ERECTILE DYSFUNCTION TYPE: ICD-10-CM

## 2021-05-04 RX ORDER — SILDENAFIL 100 MG/1
100 TABLET, FILM COATED ORAL AS NEEDED
Qty: 5 TABLET | Refills: 3 | Status: SHIPPED | OUTPATIENT
Start: 2021-05-04 | End: 2021-10-25 | Stop reason: SDUPTHER

## 2021-06-30 ENCOUNTER — OFFICE VISIT (OUTPATIENT)
Dept: FAMILY MEDICINE CLINIC | Facility: CLINIC | Age: 56
End: 2021-06-30
Payer: COMMERCIAL

## 2021-06-30 ENCOUNTER — APPOINTMENT (OUTPATIENT)
Dept: LAB | Facility: CLINIC | Age: 56
End: 2021-06-30
Payer: COMMERCIAL

## 2021-06-30 VITALS
DIASTOLIC BLOOD PRESSURE: 84 MMHG | SYSTOLIC BLOOD PRESSURE: 118 MMHG | TEMPERATURE: 98.7 F | WEIGHT: 195.8 LBS | HEIGHT: 72 IN | BODY MASS INDEX: 26.52 KG/M2 | HEART RATE: 67 BPM | OXYGEN SATURATION: 97 %

## 2021-06-30 DIAGNOSIS — R19.7 DIARRHEA OF PRESUMED INFECTIOUS ORIGIN: Primary | ICD-10-CM

## 2021-06-30 DIAGNOSIS — R19.7 DIARRHEA OF PRESUMED INFECTIOUS ORIGIN: ICD-10-CM

## 2021-06-30 LAB
ALBUMIN SERPL BCP-MCNC: 3.8 G/DL (ref 3.5–5)
ALP SERPL-CCNC: 81 U/L (ref 46–116)
ALT SERPL W P-5'-P-CCNC: 33 U/L (ref 12–78)
ANION GAP SERPL CALCULATED.3IONS-SCNC: 5 MMOL/L (ref 4–13)
AST SERPL W P-5'-P-CCNC: 15 U/L (ref 5–45)
BASOPHILS # BLD AUTO: 0.02 THOUSANDS/ΜL (ref 0–0.1)
BASOPHILS NFR BLD AUTO: 1 % (ref 0–1)
BILIRUB SERPL-MCNC: 0.57 MG/DL (ref 0.2–1)
BUN SERPL-MCNC: 15 MG/DL (ref 5–25)
CALCIUM SERPL-MCNC: 9.3 MG/DL (ref 8.3–10.1)
CHLORIDE SERPL-SCNC: 108 MMOL/L (ref 100–108)
CO2 SERPL-SCNC: 28 MMOL/L (ref 21–32)
CREAT SERPL-MCNC: 0.97 MG/DL (ref 0.6–1.3)
EOSINOPHIL # BLD AUTO: 0.03 THOUSAND/ΜL (ref 0–0.61)
EOSINOPHIL NFR BLD AUTO: 1 % (ref 0–6)
ERYTHROCYTE [DISTWIDTH] IN BLOOD BY AUTOMATED COUNT: 12.6 % (ref 11.6–15.1)
GFR SERPL CREATININE-BSD FRML MDRD: 89 ML/MIN/1.73SQ M
GLUCOSE SERPL-MCNC: 90 MG/DL (ref 65–140)
HAV IGM SER QL: NORMAL
HBV CORE IGM SER QL: NORMAL
HBV SURFACE AG SER QL: NORMAL
HCT VFR BLD AUTO: 44.3 % (ref 36.5–49.3)
HCV AB SER QL: NORMAL
HGB BLD-MCNC: 14.4 G/DL (ref 12–17)
IMM GRANULOCYTES # BLD AUTO: 0.02 THOUSAND/UL (ref 0–0.2)
IMM GRANULOCYTES NFR BLD AUTO: 1 % (ref 0–2)
LYMPHOCYTES # BLD AUTO: 0.76 THOUSANDS/ΜL (ref 0.6–4.47)
LYMPHOCYTES NFR BLD AUTO: 27 % (ref 14–44)
MCH RBC QN AUTO: 29.6 PG (ref 26.8–34.3)
MCHC RBC AUTO-ENTMCNC: 32.5 G/DL (ref 31.4–37.4)
MCV RBC AUTO: 91 FL (ref 82–98)
MONOCYTES # BLD AUTO: 0.4 THOUSAND/ΜL (ref 0.17–1.22)
MONOCYTES NFR BLD AUTO: 14 % (ref 4–12)
NEUTROPHILS # BLD AUTO: 1.59 THOUSANDS/ΜL (ref 1.85–7.62)
NEUTS SEG NFR BLD AUTO: 56 % (ref 43–75)
NRBC BLD AUTO-RTO: 0 /100 WBCS
PLATELET # BLD AUTO: 184 THOUSANDS/UL (ref 149–390)
PMV BLD AUTO: 10.2 FL (ref 8.9–12.7)
POTASSIUM SERPL-SCNC: 3.9 MMOL/L (ref 3.5–5.3)
PROT SERPL-MCNC: 7.4 G/DL (ref 6.4–8.2)
RBC # BLD AUTO: 4.87 MILLION/UL (ref 3.88–5.62)
SODIUM SERPL-SCNC: 141 MMOL/L (ref 136–145)
WBC # BLD AUTO: 2.82 THOUSAND/UL (ref 4.31–10.16)

## 2021-06-30 PROCEDURE — 85025 COMPLETE CBC W/AUTO DIFF WBC: CPT

## 2021-06-30 PROCEDURE — 80074 ACUTE HEPATITIS PANEL: CPT

## 2021-06-30 PROCEDURE — 3725F SCREEN DEPRESSION PERFORMED: CPT | Performed by: FAMILY MEDICINE

## 2021-06-30 PROCEDURE — 80053 COMPREHEN METABOLIC PANEL: CPT

## 2021-06-30 PROCEDURE — 99214 OFFICE O/P EST MOD 30 MIN: CPT | Performed by: FAMILY MEDICINE

## 2021-06-30 PROCEDURE — 1036F TOBACCO NON-USER: CPT | Performed by: FAMILY MEDICINE

## 2021-06-30 PROCEDURE — 36415 COLL VENOUS BLD VENIPUNCTURE: CPT

## 2021-06-30 PROCEDURE — 3008F BODY MASS INDEX DOCD: CPT | Performed by: FAMILY MEDICINE

## 2021-06-30 RX ORDER — TERBINAFINE HYDROCHLORIDE 250 MG/1
250 TABLET ORAL DAILY
COMMUNITY
End: 2022-02-08

## 2021-06-30 NOTE — PROGRESS NOTES
Assessment/Plan:    1  Diarrhea of presumed infectious origin  Assessment & Plan:  Patient with profuse watery diarrhea since travel to [facility]  Obtain stool studies and blood work to rule out infectious disease  Patient should increase water intake and eat bland and solid foods such as bread, rice, and bananas  Avoid too much fiber and fresh foods such as raw vegetables  If symptoms worsen or if you developed abdominal pain with fever, nausea and vomiting, go to the ED immediately  Patient verbalized understanding  Orders:  -     Stool Enteric Bacterial Panel by PCR; Future  -     Hepatitis panel, acute; Future; Expected date: 06/30/2021  -     CBC and differential; Future; Expected date: 06/30/2021  -     Comprehensive metabolic panel; Future  -     Ova and parasite examination; Future  -     Clostridium difficile toxin by PCR; Future      Subjective:      Patient ID: Joanne Rivas is a 50 y o  male  HPI    Patient presenting with complaint of diarrhea for 3 weeks  He returned from [facility] on 6/12/21  While he was on the trip his stools started to become soft and more frequent  Since the trip he has been experiencing looser stools  For the past 4-5 days the diarrhea worsened and is now watery and profuse  He is having 3-4 episodes of explosive diarrhea  So far this morning he has had diarrhea twice  No blood or mucous in the stool  He experiences cramping and gurgling in his abdomen during the bowel movements  He denies pain in between  No nausea, vomiting or fever  Paitent had history of C diff in 2000 due to antibiotic use       The following portions of the patient's history were reviewed and updated as appropriate: allergies, current medications, past family history, past medical history, past social history, past surgical history, and problem list       Current Outpatient Medications:     cholecalciferol (VITAMIN D3) 1,000 units tablet, Take 1,000 Units by mouth daily, Disp: , Rfl:     loratadine (CLARITIN) 10 mg tablet, Take 10 mg by mouth daily, Disp: , Rfl:     Lutein 20 MG CAPS, One tab by mouth daily, Disp: , Rfl:     Multiple Vitamin (multivitamin) tablet, Take 1 tablet by mouth daily, Disp: , Rfl:     Omega-3 Fatty Acids (FISH OIL PO), Take by mouth, Disp: , Rfl:     sildenafil (VIAGRA) 100 mg tablet, Take 1 tablet (100 mg total) by mouth as needed for erectile dysfunction, Disp: 5 tablet, Rfl: 3    terbinafine (LamISIL) 250 mg tablet, Take 250 mg by mouth daily, Disp: , Rfl:     ciclopirox (LOPROX) 0 77 % cream, Apply topically 2 (two) times a day (Patient not taking: Reported on 6/30/2021), Disp: 30 g, Rfl: 2    ketoconazole (NIZORAL) 2 % cream, Apply topically daily (Patient not taking: Reported on 6/30/2021), Disp: 15 g, Rfl: 5      Review of Systems   Constitutional: Negative for chills and fever  HENT: Negative for ear pain and sore throat  Eyes: Negative for pain and visual disturbance  Respiratory: Negative for cough and shortness of breath  Cardiovascular: Negative for chest pain and palpitations  Gastrointestinal: Positive for diarrhea  Negative for abdominal distention, abdominal pain, blood in stool, nausea and vomiting  Genitourinary: Negative for dysuria and hematuria  Musculoskeletal: Negative for arthralgias and back pain  Skin: Negative for color change and rash  Neurological: Negative for seizures and syncope  All other systems reviewed and are negative  Objective:      /84 (BP Location: Left arm, Patient Position: Sitting, Cuff Size: Large)   Pulse 67   Temp 98 7 °F (37 1 °C) (Tympanic)   Ht 6' (1 829 m)   Wt 88 8 kg (195 lb 12 8 oz)   SpO2 97%   BMI 26 56 kg/m²          Physical Exam  Vitals and nursing note reviewed  Constitutional:       General: He is not in acute distress  Appearance: Normal appearance  He is not ill-appearing or diaphoretic     HENT:      Head: Normocephalic and atraumatic  Eyes:      Extraocular Movements: Extraocular movements intact  Conjunctiva/sclera: Conjunctivae normal    Cardiovascular:      Rate and Rhythm: Normal rate and regular rhythm  Heart sounds: Normal heart sounds  No murmur heard  Pulmonary:      Effort: Pulmonary effort is normal  No respiratory distress  Breath sounds: Normal breath sounds  No wheezing  Abdominal:      General: Abdomen is flat  Bowel sounds are increased  There is no distension  Palpations: Abdomen is soft  There is no mass  Tenderness: There is abdominal tenderness in the left lower quadrant  There is no guarding or rebound  Musculoskeletal:      Cervical back: Normal range of motion  Neurological:      Mental Status: He is alert

## 2021-06-30 NOTE — ASSESSMENT & PLAN NOTE
Patient with profuse watery diarrhea since travel to [facility]  Obtain stool studies and blood work to rule out infectious disease  Patient should increase water intake and eat bland and solid foods such as bread, rice, and bananas  Avoid too much fiber and fresh foods such as raw vegetables  If symptoms worsen or if you developed abdominal pain with fever, nausea and vomiting, go to the ED immediately  Patient verbalized understanding

## 2021-07-01 ENCOUNTER — APPOINTMENT (OUTPATIENT)
Dept: LAB | Facility: CLINIC | Age: 56
End: 2021-07-01
Payer: COMMERCIAL

## 2021-07-01 DIAGNOSIS — R19.7 DIARRHEA OF PRESUMED INFECTIOUS ORIGIN: ICD-10-CM

## 2021-07-01 PROCEDURE — 87505 NFCT AGENT DETECTION GI: CPT

## 2021-07-01 PROCEDURE — 87177 OVA AND PARASITES SMEARS: CPT

## 2021-07-01 PROCEDURE — 87209 SMEAR COMPLEX STAIN: CPT

## 2021-07-02 LAB
C DIFF TOX GENS STL QL NAA+PROBE: NEGATIVE
CAMPYLOBACTER DNA SPEC NAA+PROBE: NORMAL
SALMONELLA DNA SPEC QL NAA+PROBE: NORMAL
SHIGA TOXIN STX GENE SPEC NAA+PROBE: NORMAL
SHIGELLA DNA SPEC QL NAA+PROBE: NORMAL

## 2021-07-07 LAB — O+P STL CONC: NORMAL

## 2021-07-08 DIAGNOSIS — D72.810 LYMPHOPENIA: Primary | ICD-10-CM

## 2021-08-18 ENCOUNTER — APPOINTMENT (OUTPATIENT)
Dept: LAB | Facility: CLINIC | Age: 56
End: 2021-08-18
Payer: COMMERCIAL

## 2021-08-18 DIAGNOSIS — D72.810 LYMPHOPENIA: ICD-10-CM

## 2021-08-18 DIAGNOSIS — Z91.89 AT RISK FOR HEPATOTOXICITY: ICD-10-CM

## 2021-08-18 LAB
ALBUMIN SERPL BCP-MCNC: 3.7 G/DL (ref 3.5–5)
ALP SERPL-CCNC: 78 U/L (ref 46–116)
ALT SERPL W P-5'-P-CCNC: 36 U/L (ref 12–78)
ANION GAP SERPL CALCULATED.3IONS-SCNC: 2 MMOL/L (ref 4–13)
AST SERPL W P-5'-P-CCNC: 15 U/L (ref 5–45)
BASOPHILS # BLD AUTO: 0.03 THOUSANDS/ΜL (ref 0–0.1)
BASOPHILS NFR BLD AUTO: 1 % (ref 0–1)
BILIRUB SERPL-MCNC: 0.46 MG/DL (ref 0.2–1)
BUN SERPL-MCNC: 16 MG/DL (ref 5–25)
CALCIUM SERPL-MCNC: 8.8 MG/DL (ref 8.3–10.1)
CHLORIDE SERPL-SCNC: 109 MMOL/L (ref 100–108)
CO2 SERPL-SCNC: 30 MMOL/L (ref 21–32)
CREAT SERPL-MCNC: 0.89 MG/DL (ref 0.6–1.3)
EOSINOPHIL # BLD AUTO: 0.08 THOUSAND/ΜL (ref 0–0.61)
EOSINOPHIL NFR BLD AUTO: 3 % (ref 0–6)
ERYTHROCYTE [DISTWIDTH] IN BLOOD BY AUTOMATED COUNT: 13.2 % (ref 11.6–15.1)
GFR SERPL CREATININE-BSD FRML MDRD: 98 ML/MIN/1.73SQ M
GLUCOSE SERPL-MCNC: 86 MG/DL (ref 65–140)
HCT VFR BLD AUTO: 44.2 % (ref 36.5–49.3)
HGB BLD-MCNC: 14.3 G/DL (ref 12–17)
IMM GRANULOCYTES # BLD AUTO: 0.01 THOUSAND/UL (ref 0–0.2)
IMM GRANULOCYTES NFR BLD AUTO: 0 % (ref 0–2)
LYMPHOCYTES # BLD AUTO: 0.89 THOUSANDS/ΜL (ref 0.6–4.47)
LYMPHOCYTES NFR BLD AUTO: 28 % (ref 14–44)
MCH RBC QN AUTO: 29.5 PG (ref 26.8–34.3)
MCHC RBC AUTO-ENTMCNC: 32.4 G/DL (ref 31.4–37.4)
MCV RBC AUTO: 91 FL (ref 82–98)
MONOCYTES # BLD AUTO: 0.39 THOUSAND/ΜL (ref 0.17–1.22)
MONOCYTES NFR BLD AUTO: 12 % (ref 4–12)
NEUTROPHILS # BLD AUTO: 1.83 THOUSANDS/ΜL (ref 1.85–7.62)
NEUTS SEG NFR BLD AUTO: 56 % (ref 43–75)
NRBC BLD AUTO-RTO: 0 /100 WBCS
PLATELET # BLD AUTO: 168 THOUSANDS/UL (ref 149–390)
PMV BLD AUTO: 10.6 FL (ref 8.9–12.7)
POTASSIUM SERPL-SCNC: 4.2 MMOL/L (ref 3.5–5.3)
PROT SERPL-MCNC: 7.1 G/DL (ref 6.4–8.2)
RBC # BLD AUTO: 4.84 MILLION/UL (ref 3.88–5.62)
SODIUM SERPL-SCNC: 141 MMOL/L (ref 136–145)
WBC # BLD AUTO: 3.23 THOUSAND/UL (ref 4.31–10.16)

## 2021-08-18 PROCEDURE — 85025 COMPLETE CBC W/AUTO DIFF WBC: CPT

## 2021-08-18 PROCEDURE — 80053 COMPREHEN METABOLIC PANEL: CPT

## 2021-08-18 PROCEDURE — 36415 COLL VENOUS BLD VENIPUNCTURE: CPT

## 2021-08-20 ENCOUNTER — APPOINTMENT (OUTPATIENT)
Dept: LAB | Facility: CLINIC | Age: 56
End: 2021-08-20
Payer: COMMERCIAL

## 2021-08-20 DIAGNOSIS — E29.1 TESTICULAR HYPOFUNCTION: ICD-10-CM

## 2021-08-20 DIAGNOSIS — R41.3 AMNESIA: ICD-10-CM

## 2021-08-20 DIAGNOSIS — G47.00 INSOMNIA, UNSPECIFIED TYPE: ICD-10-CM

## 2021-08-20 LAB
25(OH)D3 SERPL-MCNC: 43.8 NG/ML (ref 30–100)
CORTIS SERPL-MCNC: 21 UG/DL
PSA SERPL-MCNC: 1.3 NG/ML (ref 0–4)
TESTOST SERPL-MCNC: 494 NG/DL (ref 95–948)
VIT B12 SERPL-MCNC: 539 PG/ML (ref 100–900)

## 2021-08-20 PROCEDURE — 84403 ASSAY OF TOTAL TESTOSTERONE: CPT

## 2021-08-20 PROCEDURE — 82607 VITAMIN B-12: CPT

## 2021-08-20 PROCEDURE — 82306 VITAMIN D 25 HYDROXY: CPT

## 2021-08-20 PROCEDURE — 36415 COLL VENOUS BLD VENIPUNCTURE: CPT

## 2021-08-20 PROCEDURE — 82627 DEHYDROEPIANDROSTERONE: CPT

## 2021-08-20 PROCEDURE — G0103 PSA SCREENING: HCPCS

## 2021-08-20 PROCEDURE — 82533 TOTAL CORTISOL: CPT

## 2021-08-20 PROCEDURE — 84270 ASSAY OF SEX HORMONE GLOBUL: CPT

## 2021-08-21 LAB
DHEA-S SERPL-MCNC: 248 UG/DL (ref 71.6–375.4)
SHBG SERPL-SCNC: 32.8 NMOL/L (ref 19.3–76.4)

## 2021-10-25 DIAGNOSIS — N52.9 ERECTILE DYSFUNCTION, UNSPECIFIED ERECTILE DYSFUNCTION TYPE: ICD-10-CM

## 2021-10-26 RX ORDER — SILDENAFIL 100 MG/1
100 TABLET, FILM COATED ORAL AS NEEDED
Qty: 5 TABLET | Refills: 0 | Status: SHIPPED | OUTPATIENT
Start: 2021-10-26 | End: 2021-10-27

## 2021-10-27 DIAGNOSIS — N52.9 ERECTILE DYSFUNCTION, UNSPECIFIED ERECTILE DYSFUNCTION TYPE: ICD-10-CM

## 2021-10-27 RX ORDER — SILDENAFIL 100 MG/1
TABLET, FILM COATED ORAL
Qty: 5 TABLET | Refills: 1 | Status: SHIPPED | OUTPATIENT
Start: 2021-10-27 | End: 2022-01-06

## 2021-12-10 ENCOUNTER — OFFICE VISIT (OUTPATIENT)
Dept: FAMILY MEDICINE CLINIC | Facility: CLINIC | Age: 56
End: 2021-12-10
Payer: COMMERCIAL

## 2021-12-10 VITALS
HEART RATE: 76 BPM | BODY MASS INDEX: 27.22 KG/M2 | OXYGEN SATURATION: 97 % | HEIGHT: 72 IN | RESPIRATION RATE: 16 BRPM | TEMPERATURE: 97.7 F | SYSTOLIC BLOOD PRESSURE: 138 MMHG | WEIGHT: 201 LBS | DIASTOLIC BLOOD PRESSURE: 90 MMHG

## 2021-12-10 DIAGNOSIS — Z13.6 SCREENING FOR CARDIOVASCULAR CONDITION: ICD-10-CM

## 2021-12-10 DIAGNOSIS — R53.83 FATIGUE, UNSPECIFIED TYPE: ICD-10-CM

## 2021-12-10 DIAGNOSIS — Z00.00 ANNUAL PHYSICAL EXAM: ICD-10-CM

## 2021-12-10 DIAGNOSIS — D72.810 LYMPHOPENIA: ICD-10-CM

## 2021-12-10 DIAGNOSIS — Z13.1 SCREENING FOR DIABETES MELLITUS: ICD-10-CM

## 2021-12-10 DIAGNOSIS — Z23 NEED FOR VACCINATION: Primary | ICD-10-CM

## 2021-12-10 DIAGNOSIS — Z13.29 SCREENING FOR THYROID DISORDER: ICD-10-CM

## 2021-12-10 PROCEDURE — 90682 RIV4 VACC RECOMBINANT DNA IM: CPT | Performed by: FAMILY MEDICINE

## 2021-12-10 PROCEDURE — 99396 PREV VISIT EST AGE 40-64: CPT | Performed by: FAMILY MEDICINE

## 2021-12-10 PROCEDURE — 1036F TOBACCO NON-USER: CPT | Performed by: FAMILY MEDICINE

## 2021-12-10 PROCEDURE — 3008F BODY MASS INDEX DOCD: CPT | Performed by: FAMILY MEDICINE

## 2021-12-10 PROCEDURE — 90471 IMMUNIZATION ADMIN: CPT | Performed by: FAMILY MEDICINE

## 2021-12-13 ENCOUNTER — APPOINTMENT (OUTPATIENT)
Dept: LAB | Facility: CLINIC | Age: 56
End: 2021-12-13
Payer: COMMERCIAL

## 2021-12-13 DIAGNOSIS — D72.810 LYMPHOPENIA: ICD-10-CM

## 2021-12-13 DIAGNOSIS — Z13.6 SCREENING FOR CARDIOVASCULAR CONDITION: ICD-10-CM

## 2021-12-13 DIAGNOSIS — R53.83 FATIGUE, UNSPECIFIED TYPE: ICD-10-CM

## 2021-12-13 DIAGNOSIS — Z13.29 SCREENING FOR THYROID DISORDER: ICD-10-CM

## 2021-12-13 DIAGNOSIS — Z13.1 SCREENING FOR DIABETES MELLITUS: ICD-10-CM

## 2021-12-13 LAB
ALBUMIN SERPL BCP-MCNC: 3.9 G/DL (ref 3.5–5)
ALP SERPL-CCNC: 63 U/L (ref 46–116)
ALT SERPL W P-5'-P-CCNC: 32 U/L (ref 12–78)
ANION GAP SERPL CALCULATED.3IONS-SCNC: 3 MMOL/L (ref 4–13)
AST SERPL W P-5'-P-CCNC: 17 U/L (ref 5–45)
BASOPHILS # BLD AUTO: 0.03 THOUSANDS/ΜL (ref 0–0.1)
BASOPHILS NFR BLD AUTO: 1 % (ref 0–1)
BILIRUB SERPL-MCNC: 0.49 MG/DL (ref 0.2–1)
BUN SERPL-MCNC: 16 MG/DL (ref 5–25)
CALCIUM SERPL-MCNC: 9.1 MG/DL (ref 8.3–10.1)
CHLORIDE SERPL-SCNC: 106 MMOL/L (ref 100–108)
CHOLEST SERPL-MCNC: 147 MG/DL
CO2 SERPL-SCNC: 32 MMOL/L (ref 21–32)
CREAT SERPL-MCNC: 1.08 MG/DL (ref 0.6–1.3)
EOSINOPHIL # BLD AUTO: 0.1 THOUSAND/ΜL (ref 0–0.61)
EOSINOPHIL NFR BLD AUTO: 3 % (ref 0–6)
ERYTHROCYTE [DISTWIDTH] IN BLOOD BY AUTOMATED COUNT: 12.4 % (ref 11.6–15.1)
GFR SERPL CREATININE-BSD FRML MDRD: 77 ML/MIN/1.73SQ M
GLUCOSE P FAST SERPL-MCNC: 84 MG/DL (ref 65–99)
HCT VFR BLD AUTO: 48.3 % (ref 36.5–49.3)
HDLC SERPL-MCNC: 49 MG/DL
HGB BLD-MCNC: 15.2 G/DL (ref 12–17)
IMM GRANULOCYTES # BLD AUTO: 0.02 THOUSAND/UL (ref 0–0.2)
IMM GRANULOCYTES NFR BLD AUTO: 1 % (ref 0–2)
LDLC SERPL CALC-MCNC: 78 MG/DL (ref 0–100)
LYMPHOCYTES # BLD AUTO: 1.04 THOUSANDS/ΜL (ref 0.6–4.47)
LYMPHOCYTES NFR BLD AUTO: 30 % (ref 14–44)
MCH RBC QN AUTO: 28.9 PG (ref 26.8–34.3)
MCHC RBC AUTO-ENTMCNC: 31.5 G/DL (ref 31.4–37.4)
MCV RBC AUTO: 92 FL (ref 82–98)
MONOCYTES # BLD AUTO: 0.47 THOUSAND/ΜL (ref 0.17–1.22)
MONOCYTES NFR BLD AUTO: 13 % (ref 4–12)
NEUTROPHILS # BLD AUTO: 1.87 THOUSANDS/ΜL (ref 1.85–7.62)
NEUTS SEG NFR BLD AUTO: 52 % (ref 43–75)
NRBC BLD AUTO-RTO: 0 /100 WBCS
PLATELET # BLD AUTO: 189 THOUSANDS/UL (ref 149–390)
PMV BLD AUTO: 10.4 FL (ref 8.9–12.7)
POTASSIUM SERPL-SCNC: 3.6 MMOL/L (ref 3.5–5.3)
PROT SERPL-MCNC: 6.6 G/DL (ref 6.4–8.2)
RBC # BLD AUTO: 5.26 MILLION/UL (ref 3.88–5.62)
SODIUM SERPL-SCNC: 141 MMOL/L (ref 136–145)
TRIGL SERPL-MCNC: 102 MG/DL
TSH SERPL DL<=0.05 MIU/L-ACNC: 1.91 UIU/ML (ref 0.36–3.74)
WBC # BLD AUTO: 3.53 THOUSAND/UL (ref 4.31–10.16)

## 2021-12-13 PROCEDURE — 84402 ASSAY OF FREE TESTOSTERONE: CPT

## 2021-12-13 PROCEDURE — 85025 COMPLETE CBC W/AUTO DIFF WBC: CPT

## 2021-12-13 PROCEDURE — 36415 COLL VENOUS BLD VENIPUNCTURE: CPT

## 2021-12-13 PROCEDURE — 80053 COMPREHEN METABOLIC PANEL: CPT

## 2021-12-13 PROCEDURE — 80061 LIPID PANEL: CPT

## 2021-12-13 PROCEDURE — 84403 ASSAY OF TOTAL TESTOSTERONE: CPT

## 2021-12-13 PROCEDURE — 84443 ASSAY THYROID STIM HORMONE: CPT

## 2021-12-14 LAB
TESTOST FREE SERPL-MCNC: 25 PG/ML (ref 7.2–24)
TESTOST SERPL-MCNC: 730 NG/DL (ref 264–916)

## 2021-12-16 ENCOUNTER — TELEPHONE (OUTPATIENT)
Dept: HEMATOLOGY ONCOLOGY | Facility: CLINIC | Age: 56
End: 2021-12-16

## 2022-01-05 DIAGNOSIS — N52.9 ERECTILE DYSFUNCTION, UNSPECIFIED ERECTILE DYSFUNCTION TYPE: ICD-10-CM

## 2022-01-06 RX ORDER — SILDENAFIL 100 MG/1
TABLET, FILM COATED ORAL
Qty: 6 TABLET | Refills: 1 | Status: SHIPPED | OUTPATIENT
Start: 2022-01-06

## 2022-01-07 ENCOUNTER — CONSULT (OUTPATIENT)
Dept: HEMATOLOGY ONCOLOGY | Facility: CLINIC | Age: 57
End: 2022-01-07
Payer: COMMERCIAL

## 2022-01-07 VITALS
HEIGHT: 72 IN | OXYGEN SATURATION: 98 % | DIASTOLIC BLOOD PRESSURE: 84 MMHG | TEMPERATURE: 96.3 F | WEIGHT: 206 LBS | SYSTOLIC BLOOD PRESSURE: 130 MMHG | BODY MASS INDEX: 27.9 KG/M2 | RESPIRATION RATE: 16 BRPM | HEART RATE: 64 BPM

## 2022-01-07 DIAGNOSIS — D72.819 LEUKOPENIA, UNSPECIFIED TYPE: Primary | ICD-10-CM

## 2022-01-07 PROCEDURE — 99244 OFF/OP CNSLTJ NEW/EST MOD 40: CPT | Performed by: INTERNAL MEDICINE

## 2022-01-07 PROCEDURE — 1036F TOBACCO NON-USER: CPT | Performed by: INTERNAL MEDICINE

## 2022-01-07 PROCEDURE — 3008F BODY MASS INDEX DOCD: CPT | Performed by: INTERNAL MEDICINE

## 2022-01-07 NOTE — PROGRESS NOTES
[facility] - Hematology & Medical Oncology  Outpatient Visit Encounter Note      Jadon Castro 49 y o  male [id number] Date:  1/7/2022    HEMATOLOGICAL HISTORY        Clotting History Denies   Bleeding History Denies   Cancer History Denies   Family Cancer History Maternal GF - Brain  Maternal Aunt - Lung   H/O Blood/Plt Transfusion Denies   Tobacco Use Denies   H/O COVID19 Infection    COVID19 Vaccine Status          SUBJECTIVE      Jadon Castro is a 49 y o  here for new consultation with me today  The patient is referred by Alfa Larson DO and the reason for consultation is D72 819 (ICD-10-CM) - Leukopenia, unspecified type      In review of the chart and talking with the patient, he is diagnosed with Hereditary retinal dystrophy; RP1 and follows with optho   His CBC shows the following:      Component      Latest Ref Rng & Units 10/28/2019 6/30/2021 8/18/2021 12/13/2021   WBC      4 31 - 10 16 Thousand/uL 3 66 (L) 2 82 (L) 3 23 (L) 3 53 (L)   Red Blood Cell Count      3 88 - 5 62 Million/uL 4 88 4 87 4 84 5 26   Hemoglobin      12 0 - 17 0 g/dL 14 2 14 4 14 3 15 2   HCT      36 5 - 49 3 % 45 0 44 3 44 2 48 3   MCV      82 - 98 fL 92 91 91 92   MCH      26 8 - 34 3 pg 29 1 29 6 29 5 28 9   MCHC      31 4 - 37 4 g/dL 31 6 32 5 32 4 31 5   RDW      11 6 - 15 1 % 12 7 12 6 13 2 12 4   MPV      8 9 - 12 7 fL 10 3 10 2 10 6 10 4   Platelet Count      951 - 390 Thousands/uL 180 184 168 189   nRBC      /100 WBCs 0 0 0 0   Neutrophils %      43 - 75 % 50 56 56 52   Immat GRANS %      0 - 2 % 1 1 0 1   Lymphocytes Relative      14 - 44 % 31 27 28 30   Monocytes Relative      4 - 12 % 13 (H) 14 (H) 12 13 (H)   Eosinophils      0 - 6 % 4 1 3 3   Basophils Relative      0 - 1 % 1 1 1 1   Absolute Neutrophils      1 85 - 7 62 Thousands/µL 1 86 1 59 (L) 1 83 (L) 1 87   Immature Grans Absolute      0 00 - 0 20 Thousand/uL 0 02 0 02 0 01 0 02   Lymphocytes Absolute 0 60 - 4 47 Thousands/µL 1 15 0 76 0 89 1 04   Absolute Monocytes      0 17 - 1 22 Thousand/µL 0 46 0 40 0 39 0 47   Absolute Eosinophils      0 00 - 0 61 Thousand/µL 0 13 0 03 0 08 0 10   Basophils Absolute      0 00 - 0 10 Thousands/µL 0 04 0 02 0 03 0 03     Denies any f/c/n/v/cp/ap/sob/cough  Denies diarrhea or skin rash  No known history of HIV or hepatitis  I have reviewed the relevant past medical, surgical, social and family history  I have also reviewed allergies and medications for this patient  Review of Systems  Review of Systems   All other systems reviewed and are negative  OBJECTIVE     Physical Exam  Vitals:    01/07/22 1113   BP: 130/84   BP Location: Left arm   Patient Position: Sitting   Cuff Size: Large   Pulse: 64   Resp: 16   Temp: (!) 96 3 °F (35 7 °C)   TempSrc: Tympanic   SpO2: 98%   Weight: 93 4 kg (206 lb)   Height: 5' 11 5" (1 816 m)       Physical Exam  Vitals reviewed  Constitutional:       General: He is not in acute distress  Appearance: He is not ill-appearing, toxic-appearing or diaphoretic  HENT:      Head: Normocephalic and atraumatic  Eyes:      General: No scleral icterus  Extraocular Movements: Extraocular movements intact  Cardiovascular:      Rate and Rhythm: Normal rate  Pulmonary:      Effort: Pulmonary effort is normal  No respiratory distress  Abdominal:      General: There is no distension  Musculoskeletal:         General: Normal range of motion  Cervical back: Normal range of motion and neck supple  Neurological:      General: No focal deficit present  Mental Status: He is alert and oriented to person, place, and time  Gait: Gait normal           Imaging  Relevant imaging reviewed in chart    Labs  Relevant labs reviewed in chart   ASSESSMENT & PLAN      Diagnosis ICD-10-CM Associated Orders   1   Leukopenia, unspecified type  D72 819 Ambulatory referral to Hematology / Oncology     Ambulatory referral to Interventional Radiology     HIV 1/2 Antigen/Antibody (4th Generation) w Reflex [facility]     Chronic Hepatitis Panel         49 y o  male with isolated mild/moderate leukopenia seen in consultation  In review of labs and understanding clinical history, this lab abnormality has existed since at least July 2013  His differential almost always shows mild elevation in relative monocytes and mild decrease or normal absolute neutrophil count  · Discussion  · Based on the history and lab review, this appears most likely benign given the duration of this WBC with no lab abnormalities to Hb and platelet count  · For comprehensive assessment, I recommend a bone marrow biopsy  Alternatively, monitoring with repeat labs in 6 months is also a consideration  · After understanding the benefit/risk to each approach,   · Plan/Labs  · HIV and Hepatitis      Follow Up   2 weeks after the marrow      All questions were answered to the patient's satisfaction during this encounter  They appreciated and thanked me for spending time with them  The patient knows the contact information for our office and know to reach out for any relevant concerns related to this encounter  For all other listed problems and medical diagnosis in his chart - they are managed by PCP and/or other specialists, which patient acknowledges  Dr Jerome Alexander MD  Hematology & Medical Oncology

## 2022-01-31 ENCOUNTER — HOSPITAL ENCOUNTER (OUTPATIENT)
Dept: RADIOLOGY | Facility: HOSPITAL | Age: 57
Discharge: HOME | End: 2022-01-31
Attending: INTERNAL MEDICINE | Admitting: INTERNAL MEDICINE
Payer: COMMERCIAL

## 2022-01-31 VITALS
HEART RATE: 68 BPM | SYSTOLIC BLOOD PRESSURE: 154 MMHG | RESPIRATION RATE: 16 BRPM | WEIGHT: 205.69 LBS | HEIGHT: 71 IN | BODY MASS INDEX: 28.8 KG/M2 | TEMPERATURE: 98.2 F | OXYGEN SATURATION: 96 % | DIASTOLIC BLOOD PRESSURE: 78 MMHG

## 2022-01-31 DIAGNOSIS — D72.819 LEUCOPENIA: ICD-10-CM

## 2022-01-31 PROCEDURE — 88311 DECALCIFY TISSUE: CPT | Performed by: PATHOLOGY

## 2022-01-31 PROCEDURE — 88313 SPECIAL STAINS GROUP 2: CPT | Performed by: PATHOLOGY

## 2022-01-31 PROCEDURE — 85097 BONE MARROW INTERPRETATION: CPT | Performed by: PATHOLOGY

## 2022-01-31 PROCEDURE — 88184 FLOWCYTOMETRY/ TC 1 MARKER: CPT | Performed by: INTERNAL MEDICINE

## 2022-01-31 PROCEDURE — 88342 IMHCHEM/IMCYTCHM 1ST ANTB: CPT | Performed by: PATHOLOGY

## 2022-01-31 PROCEDURE — 88262 CHROMOSOME ANALYSIS 15-20: CPT | Performed by: INTERNAL MEDICINE

## 2022-01-31 PROCEDURE — 88305 TISSUE EXAM BY PATHOLOGIST: CPT | Performed by: PATHOLOGY

## 2022-01-31 PROCEDURE — 99152 MOD SED SAME PHYS/QHP 5/>YRS: CPT | Performed by: INTERNAL MEDICINE

## 2022-01-31 PROCEDURE — 88185 FLOWCYTOMETRY/TC ADD-ON: CPT

## 2022-01-31 PROCEDURE — 38222 DX BONE MARROW BX & ASPIR: CPT

## 2022-01-31 PROCEDURE — 88341 IMHCHEM/IMCYTCHM EA ADD ANTB: CPT | Performed by: PATHOLOGY

## 2022-01-31 PROCEDURE — 88237 TISSUE CULTURE BONE MARROW: CPT | Performed by: INTERNAL MEDICINE

## 2022-01-31 PROCEDURE — 99152 MOD SED SAME PHYS/QHP 5/>YRS: CPT

## 2022-01-31 PROCEDURE — 38222 DX BONE MARROW BX & ASPIR: CPT | Performed by: INTERNAL MEDICINE

## 2022-01-31 PROCEDURE — 99153 MOD SED SAME PHYS/QHP EA: CPT

## 2022-01-31 PROCEDURE — 77002 NEEDLE LOCALIZATION BY XRAY: CPT | Performed by: INTERNAL MEDICINE

## 2022-01-31 RX ORDER — FLUTICASONE PROPIONATE 50 MCG
1 SPRAY, SUSPENSION (ML) NASAL DAILY
COMMUNITY

## 2022-01-31 RX ORDER — IBUPROFEN 600 MG/1
600 TABLET ORAL EVERY 6 HOURS PRN
COMMUNITY

## 2022-01-31 RX ORDER — SODIUM CHLORIDE 9 MG/ML
50 INJECTION, SOLUTION INTRAVENOUS CONTINUOUS
Status: DISCONTINUED | OUTPATIENT
Start: 2022-01-31 | End: 2022-02-01 | Stop reason: HOSPADM

## 2022-01-31 RX ORDER — LIDOCAINE WITH 8.4% SOD BICARB 0.9%(10ML)
SYRINGE (ML) INJECTION CODE/TRAUMA/SEDATION MEDICATION
Status: COMPLETED | OUTPATIENT
Start: 2022-01-31 | End: 2022-01-31

## 2022-01-31 RX ORDER — MIDAZOLAM HYDROCHLORIDE 2 MG/2ML
INJECTION, SOLUTION INTRAMUSCULAR; INTRAVENOUS CODE/TRAUMA/SEDATION MEDICATION
Status: COMPLETED | OUTPATIENT
Start: 2022-01-31 | End: 2022-01-31

## 2022-01-31 RX ORDER — FENTANYL CITRATE 50 UG/ML
INJECTION, SOLUTION INTRAMUSCULAR; INTRAVENOUS CODE/TRAUMA/SEDATION MEDICATION
Status: COMPLETED | OUTPATIENT
Start: 2022-01-31 | End: 2022-01-31

## 2022-01-31 RX ADMIN — MIDAZOLAM 1 MG: 1 INJECTION INTRAMUSCULAR; INTRAVENOUS at 08:18

## 2022-01-31 RX ADMIN — FENTANYL CITRATE 50 MCG: 50 INJECTION INTRAMUSCULAR; INTRAVENOUS at 08:18

## 2022-01-31 RX ADMIN — SODIUM CHLORIDE 50 ML/HR: 0.9 INJECTION, SOLUTION INTRAVENOUS at 07:32

## 2022-01-31 RX ADMIN — MIDAZOLAM 1 MG: 1 INJECTION INTRAMUSCULAR; INTRAVENOUS at 08:23

## 2022-01-31 RX ADMIN — FENTANYL CITRATE 50 MCG: 50 INJECTION INTRAMUSCULAR; INTRAVENOUS at 08:23

## 2022-01-31 RX ADMIN — Medication 5 ML: at 08:21

## 2022-01-31 NOTE — H&P
Interventional Radiology  History and Physical 1/31/2022     Jadon Castro   1965   [id number]    Assessment/Plan:    49year old male with leukopenia of uncertain etiology  Plan for fluoroscopically guided bone marrow biopsy  Procedure, risks, benefits, alternatives explained to the patient  Consent obtained at bedside  Problem List Items Addressed This Visit     None      Visit Diagnoses     Leucopenia        Relevant Medications    ibuprofen (MOTRIN) 600 mg tablet    Other Relevant Orders    IR biopsy bone marrow             Subjective: normal state of health, no acute complaints  Patient ID: Jadon Castro is a 49 y o  male  History of Present Illness  See A/P above  Review of Systems   Respiratory: Negative  Cardiovascular: Negative            Past Medical History:   Diagnosis Date    BPH (benign prostatic hyperplasia)     Retinitis pigmentosa         Past Surgical History:   Procedure Laterality Date    NASAL SEPTUM SURGERY  1985    NERVE REPAIR Left 2000    L/thumb        Social History     Tobacco Use   Smoking Status Never Smoker   Smokeless Tobacco Never Used        Social History     Substance and Sexual Activity   Alcohol Use Yes    Comment: casual        Social History     Substance and Sexual Activity   Drug Use Never        No Known Allergies    Current Outpatient Medications   Medication Sig Dispense Refill    fluticasone (FLONASE) 50 mcg/act nasal spray 1 spray into each nostril daily      ibuprofen (MOTRIN) 600 mg tablet Take 600 mg by mouth every 6 (six) hours as needed for mild pain      cholecalciferol (VITAMIN D3) 1,000 units tablet Take 1,000 Units by mouth daily      ciclopirox (LOPROX) 0 77 % cream Apply topically 2 (two) times a day (Patient not taking: Reported on 6/30/2021) 30 g 2    ketoconazole (NIZORAL) 2 % cream Apply topically daily (Patient not taking: Reported on 1/31/2022 ) 15 g 5    Lutein 20 MG CAPS Take 20 mg by mouth in the morning        Omega-3 Fatty Acids (FISH OIL PO) Take 1 tablet by mouth in the morning        sildenafil (VIAGRA) 100 mg tablet TAKE 1 TABLET BY MOUTH EVERY DAY AS NEEDED FOR ERECTILE DYSFUNCTION 6 tablet 1    terbinafine (LamISIL) 250 mg tablet Take 250 mg by mouth daily (Patient not taking: Reported on 1/31/2022 )       Current Facility-Administered Medications   Medication Dose Route Frequency Provider Last Rate Last Admin    sodium chloride 0 9 % infusion  50 mL/hr Intravenous Continuous Kris Maldonado MD 50 mL/hr at 01/31/22 0732 50 mL/hr at 01/31/22 0732          Objective:    Vitals:    01/31/22 0709 01/31/22 0715   BP: 139/84    Pulse: 59    Resp: 16    Temp: 98 °F (36 7 °C)    TempSrc: Temporal    SpO2: 97%    Weight:  93 3 kg (205 lb 11 oz)   Height:  5' 10 5" (1 791 m)        Physical Exam  Cardiovascular:      Rate and Rhythm: Normal rate and regular rhythm  Pulmonary:      Effort: Pulmonary effort is normal            No results found for: BNP   Lab Results   Component Value Date    WBC 3 53 (L) 12/13/2021    HGB 15 2 12/13/2021    HCT 48 3 12/13/2021    MCV 92 12/13/2021     12/13/2021     No results found for: INR, PROTIME  No results found for: PTT      I have personally reviewed pertinent imaging and laboratory results  Code Status: No Order  Advance Directive and Living Will:      Power of :    POLST:      This text is generated with voice recognition software  There may be translation, syntax,  or grammatical errors  If you have any questions, please contact the dictating provider

## 2022-01-31 NOTE — BRIEF OP NOTE (RAD/CATH)
INTERVENTIONAL RADIOLOGY PROCEDURE NOTE    Date: 1/31/2022    Procedure: IR BIOPSY BONE MARROW    Preoperative diagnosis:   1  Leucopenia         Postoperative diagnosis: Same  Surgeon: Rc Aguirre MD     Assistant: None  No qualified resident was available  Blood loss: Minimal    Specimens:     7ml total aspirate  One 11g 1 5cm core  Findings:     Fluoroscopically guided right iliac bone marrow aspiration / biopsy  Complications: None immediate      Anesthesia: conscious sedation

## 2022-01-31 NOTE — DISCHARGE INSTRUCTIONS
Bone Marrow Biopsy     WHAT YOU NEED TO KNOW:   A bone marrow biopsy is a procedure to remove a small amount of bone marrow from your bone  Bone marrow is the soft tissue inside your bone that helps to make blood cells  The sample is tested for disease or infection  DISCHARGE INSTRUCTIONS:     1  Limit your activities day of biopsy as directed by your doctor  2  Use medication as ordered  3  Return to your normal diet  Small sips of flat soda will help with nausea  4  Remove band-aid or dressing 24 hours after procedure  Contact Interventional Radiology at [phone] [place] PATIENTS: Contact Interventional Radiology at [phone]) [facility] PATIENTS: Contact Interventional Radiology at [phone]) if:    1  Difficulty breathing, nausea or vomiting  2  Chills or fever above 101 F     3  Pain at biopsy site not relieved by medication  4  Develop any redness, swelling, heat, unusual drainage, heavy bruising or bleeding from biopsy site  Procedural Sedation   WHAT YOU NEED TO KNOW:   Procedural sedation is medicine used during procedures to help you feel relaxed and calm  You will remember little to none of the procedure  After sedation you may feel tired, weak, or unsteady on your feet  You may also have trouble concentrating or short-term memory loss  These symptoms should go away in 24 hours or less  DISCHARGE INSTRUCTIONS:   Call 911 or have someone else call for any of the following:   · You have sudden trouble breathing      · You cannot be woken    ·    Contact Interventional Radiology at [phone] PATIENTS: Contact Interventional Radiology at [phone] [facility] PATIENTS: Contact Interventional Radiology at [phone]) if any of the following occur:      · You have a severe headache or dizziness      · Your heart is beating faster than usual     · You have a fever or chills      · Your skin is itchy, swollen, or you have a rash      · You have nausea or are vomiting for more than 8 hours after the procedure       · You have questions or concerns about your condition or care  Self-care:   · Have someone stay with you for 24 hours  This person can drive you to errands and help you do things around the house  This person can also watch for problems       · Rest and do quiet activities for 24 hours  Do not exercise, ride a bike, or play sports  Stand up slowly to prevent dizziness and falls  Take short walks around the house with another person  Slowly return to your usual activities the next day       · Do not drive or use dangerous machines or tools for 24 hours  You may injure yourself or others  Examples include a lawnmower, saw, or drill  Do not return to work for 24 hours if you use dangerous machines or tools for work       · Do not make important decisions for 24 hours  For example, do not sign important papers or invest money       · Drink liquids as directed  Liquids help flush the sedation medicine out of your body  Ask how much liquid to drink each day and which liquids are best for you       · Eat small, frequent meals to prevent nausea and vomiting  Start with clear liquids such as juice or broth  If you do not vomit after clear liquids, you can eat your usual foods       · Do not drink alcohol or take medicines that make you drowsy  This includes medicines that help you sleep and anxiety medicines  Ask your healthcare provider if it is safe for you to take pain medicine  Follow up with your healthcare provider as directed: Write down your questions so you remember to ask them during your visits  Procedural Sedation   WHAT YOU NEED TO KNOW:   Procedural sedation is medicine used during procedures to help you feel relaxed and calm  You will remember little to none of the procedure  After sedation you may feel tired, weak, or unsteady on your feet  You may also have trouble concentrating or short-term memory loss  These symptoms should go away in 24 hours or less  DISCHARGE INSTRUCTIONS:   Call 911 or have someone else call for any of the following:   · You have sudden trouble breathing      · You cannot be woken  ·    Contact Interventional Radiology at [phone] PATIENTS: Contact Interventional Radiology at [phone] [facility] PATIENTS: Contact Interventional Radiology at [phone]) if any of the following occur:      · You have a severe headache or dizziness      · Your heart is beating faster than usual     · You have a fever or chills      · Your skin is itchy, swollen, or you have a rash      · You have nausea or are vomiting for more than 8 hours after the procedure       · You have questions or concerns about your condition or care  Self-care:   · Have someone stay with you for 24 hours  This person can drive you to errands and help you do things around the house  This person can also watch for problems       · Rest and do quiet activities for 24 hours  Do not exercise, ride a bike, or play sports  Stand up slowly to prevent dizziness and falls  Take short walks around the house with another person  Slowly return to your usual activities the next day       · Do not drive or use dangerous machines or tools for 24 hours  You may injure yourself or others  Examples include a lawnmower, saw, or drill  Do not return to work for 24 hours if you use dangerous machines or tools for work       · Do not make important decisions for 24 hours  For example, do not sign important papers or invest money       · Drink liquids as directed  Liquids help flush the sedation medicine out of your body  Ask how much liquid to drink each day and which liquids are best for you       · Eat small, frequent meals to prevent nausea and vomiting  Start with clear liquids such as juice or broth  If you do not vomit after clear liquids, you can eat your usual foods       · Do not drink alcohol or take medicines that make you drowsy   This includes medicines that help you sleep and anxiety medicines  Ask your healthcare provider if it is safe for you to take pain medicine  Follow up with your healthcare provider as directed: Write down your questions so you remember to ask them during your visits

## 2022-02-02 LAB — SCAN RESULT: NORMAL

## 2022-02-08 ENCOUNTER — APPOINTMENT (OUTPATIENT)
Dept: LAB | Facility: CLINIC | Age: 57
End: 2022-02-08
Payer: COMMERCIAL

## 2022-02-08 ENCOUNTER — OFFICE VISIT (OUTPATIENT)
Dept: FAMILY MEDICINE CLINIC | Facility: CLINIC | Age: 57
End: 2022-02-08
Payer: COMMERCIAL

## 2022-02-08 VITALS
OXYGEN SATURATION: 97 % | BODY MASS INDEX: 29.18 KG/M2 | HEIGHT: 71 IN | DIASTOLIC BLOOD PRESSURE: 96 MMHG | HEART RATE: 76 BPM | SYSTOLIC BLOOD PRESSURE: 132 MMHG | TEMPERATURE: 97.8 F | WEIGHT: 208.4 LBS

## 2022-02-08 DIAGNOSIS — Z03.818 ENCOUNTER FOR OBSERVATION FOR SUSPECTED EXPOSURE TO OTHER BIOLOGICAL AGENTS RULED OUT: ICD-10-CM

## 2022-02-08 DIAGNOSIS — B35.1 ONYCHOMYCOSIS: Primary | ICD-10-CM

## 2022-02-08 LAB
SARS-COV-2 IGG SERPL QL IA: REACTIVE
SARS-COV-2 IGG+IGM SERPL QL IA: REACTIVE

## 2022-02-08 PROCEDURE — 86769 SARS-COV-2 COVID-19 ANTIBODY: CPT

## 2022-02-08 PROCEDURE — 99214 OFFICE O/P EST MOD 30 MIN: CPT | Performed by: FAMILY MEDICINE

## 2022-02-08 PROCEDURE — 36415 COLL VENOUS BLD VENIPUNCTURE: CPT

## 2022-02-08 RX ORDER — TERBINAFINE HYDROCHLORIDE 250 MG/1
250 TABLET ORAL DAILY
Qty: 90 TABLET | Refills: 0 | Status: SHIPPED | OUTPATIENT
Start: 2022-02-08 | End: 2022-05-09

## 2022-02-08 NOTE — PROGRESS NOTES
[facility]      NAME: Araceli Rondon  AGE: 49 y o  SEX: male  : 1965   MRN: [id number]    DATE: 2022  TIME: 2:51 PM    Assessment and Plan     Problem List Items Addressed This Visit     Onychomycosis - Primary     Re-treat with Lamisil 250 mg for 12 weeks  Recheck Chem panel half way through treatment  Relevant Medications    terbinafine (LamISIL) 250 mg tablet    Other Relevant Orders    Comprehensive metabolic panel      Other Visit Diagnoses     Encounter for observation for suspected exposure to other biological agents ruled out        Relevant Orders    SARS-CoV2 Antibody, Total (IgG, IgA, IgM) [facility]- Lab Collect              Return to office in:  P r n  Chief Complaint     Chief Complaint   Patient presents with    Nail Problem       History of Present Illness     Patient complains of a recurrence of onychomycosis right foot great toe  Was treated approximately 1 year ago for nail fungus on his right foot  Did respond to treatment in all toes however has now noted recurrence in only his great toe the right foot  The following portions of the patient's history were reviewed and updated as appropriate: allergies, current medications, past family history, past medical history, past social history, past surgical history and problem list     Review of Systems   Review of Systems   Constitutional: Negative  Respiratory: Negative  Cardiovascular: Negative  Gastrointestinal: Negative  Genitourinary: Negative  Musculoskeletal: Negative  Skin:        Change in color of right great toenail   Psychiatric/Behavioral: Negative          Active Problem List     Patient Active Problem List   Diagnosis    Retinitis pigmentosa    Benign prostatic hyperplasia with weak urinary stream    Onychomycosis    Tinea pedis of both feet    Erectile dysfunction    Vasectomy evaluation    Diarrhea of presumed infectious origin       Objective   /96 (BP Location: Right arm, Patient Position: Sitting, Cuff Size: Large)   Pulse 76   Temp 97 8 °F (36 6 °C) (Tympanic)   Ht 5' 10 5" (1 791 m)   Wt 94 5 kg (208 lb 6 4 oz)   SpO2 97%   BMI 29 48 kg/m²     Physical Exam  Skin:     Comments: Right great toenail discolored thickened             Current Medications     Current Outpatient Medications:     cholecalciferol (VITAMIN D3) 1,000 units tablet, Take 1,000 Units by mouth daily, Disp: , Rfl:     fluticasone (FLONASE) 50 mcg/act nasal spray, 1 spray into each nostril daily, Disp: , Rfl:     ibuprofen (MOTRIN) 600 mg tablet, Take 600 mg by mouth every 6 (six) hours as needed for mild pain, Disp: , Rfl:     Lutein 20 MG CAPS, Take 20 mg by mouth in the morning  , Disp: , Rfl:     Omega-3 Fatty Acids (FISH OIL PO), Take 1 tablet by mouth in the morning  , Disp: , Rfl:     sildenafil (VIAGRA) 100 mg tablet, TAKE 1 TABLET BY MOUTH EVERY DAY AS NEEDED FOR ERECTILE DYSFUNCTION, Disp: 6 tablet, Rfl: 1    terbinafine (LamISIL) 250 mg tablet, Take 1 tablet (250 mg total) by mouth daily, Disp: 90 tablet, Rfl: 0    Health Maintenance     Health Maintenance   Topic Date Due    HIV Screening  Never done    DTaP,Tdap,and Td Vaccines (1 - Tdap) Never done    COVID-19 Vaccine (3 - Booster for Moderna series) 09/14/2021    Depression Screening  06/30/2022    BMI: Followup Plan  12/10/2022    Annual Physical  12/10/2022    Colorectal Cancer Screening  01/27/2023    BMI: Adult  02/08/2023    Hepatitis C Screening  Completed    Influenza Vaccine  Completed    Pneumococcal Vaccine: Pediatrics (0 to 5 Years) and At-Risk Patients (6 to 59 Years)  Aged Out    HIB Vaccine  Aged Out    Hepatitis B Vaccine  Aged Out    IPV Vaccine  Aged Out    Hepatitis A Vaccine  Aged Out    Meningococcal ACWY Vaccine  Aged Out    HPV Vaccine  Aged [employer] History   Administered Date(s) Administered    COVID-19 MODERNA VACC 0 5 ML IM 03/15/2021, 04/14/2021    INFLUENZA 09/15/2017, 09/22/2018, 09/11/2019, 09/11/2019    Influenza, injectable, quadrivalent, preservative free 0 5 mL 09/11/2019    Influenza, recombinant, quadrivalent,injectable, preservative free 12/10/2021    Influenza, seasonal, injectable, preservative free 10/03/2015, 09/22/2018    Tuberculin Skin Test-PPD Intradermal 03/08/2019       Catherine Saldaña DO  [facility]

## 2022-02-11 ENCOUNTER — TELEPHONE (OUTPATIENT)
Dept: SLEEP CENTER | Facility: CLINIC | Age: 57
End: 2022-02-11

## 2022-02-11 NOTE — TELEPHONE ENCOUNTER
----- Message from Master Herrera DO sent at 2/8/2022  9:02 PM EST -----  approved  ----- Message -----  From: Sabina Perry  Sent: 2/2/2022   8:20 AM EST  To: Sleep Medicine [facility] Provider    This sleep study needs approval      If approved please sign and return to clerical pool  If denied please include reasons why  Also provide alternative testing if warranted  Please sign and return to clerical pool

## 2022-02-14 ENCOUNTER — OFFICE VISIT (OUTPATIENT)
Dept: HEMATOLOGY ONCOLOGY | Facility: CLINIC | Age: 57
End: 2022-02-14
Payer: COMMERCIAL

## 2022-02-14 VITALS
WEIGHT: 209 LBS | DIASTOLIC BLOOD PRESSURE: 76 MMHG | OXYGEN SATURATION: 98 % | TEMPERATURE: 96.8 F | SYSTOLIC BLOOD PRESSURE: 132 MMHG | HEIGHT: 71 IN | HEART RATE: 78 BPM | RESPIRATION RATE: 18 BRPM | BODY MASS INDEX: 29.26 KG/M2

## 2022-02-14 DIAGNOSIS — R79.89 ABNORMAL CBC: Primary | ICD-10-CM

## 2022-02-14 PROCEDURE — 3008F BODY MASS INDEX DOCD: CPT | Performed by: INTERNAL MEDICINE

## 2022-02-14 PROCEDURE — 99214 OFFICE O/P EST MOD 30 MIN: CPT | Performed by: INTERNAL MEDICINE

## 2022-02-14 PROCEDURE — 1036F TOBACCO NON-USER: CPT | Performed by: INTERNAL MEDICINE

## 2022-02-14 NOTE — PROGRESS NOTES
[facility] - Hematology & Medical Oncology  Outpatient Visit Encounter Note      Polo Lala 49 y o  male [id number] Date:  2/14/2022    HEMATOLOGICAL HISTORY        Clotting History Denies   Bleeding History Denies   Cancer History Denies   Family Cancer History Maternal GF - Brain  Maternal Aunt - Lung   H/O Blood/Plt Transfusion Denies   Tobacco Use Denies   H/O COVID19 Infection    COVID19 Vaccine Status          SUBJECTIVE      Polo Lala is a 49 y o  here for new consultation with me today  The patient is referred by Moose Barry DO and the reason for consultation is D72 819 (ICD-10-CM) - Leukopenia, unspecified type      In review of the chart and talking with the patient, he is diagnosed with Hereditary retinal dystrophy; 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 follows with optho   His CBC shows the following:      Component      Latest Ref Rng & Units 10/28/2019 6/30/2021 8/18/2021 12/13/2021   WBC      4 31 - 10 16 Thousand/uL 3 66 (L) 2 82 (L) 3 23 (L) 3 53 (L)   Red Blood Cell Count      3 88 - 5 62 Million/uL 4 88 4 87 4 84 5 26   Hemoglobin      12 0 - 17 0 g/dL 14 2 14 4 14 3 15 2   HCT      36 5 - 49 3 % 45 0 44 3 44 2 48 3   MCV      82 - 98 fL 92 91 91 92   MCH      26 8 - 34 3 pg 29 1 29 6 29 5 28 9   MCHC      31 4 - 37 4 g/dL 31 6 32 5 32 4 31 5   RDW      11 6 - 15 1 % 12 7 12 6 13 2 12 4   MPV      8 9 - 12 7 fL 10 3 10 2 10 6 10 4   Platelet Count      153 - 390 Thousands/uL 180 184 168 189   nRBC      /100 WBCs 0 0 0 0   Neutrophils %      43 - 75 % 50 56 56 52   Immat GRANS %      0 - 2 % 1 1 0 1   Lymphocytes Relative      14 - 44 % 31 27 28 30   Monocytes Relative      4 - 12 % 13 (H) 14 (H) 12 13 (H)   Eosinophils      0 - 6 % 4 1 3 3   Basophils Relative      0 - 1 % 1 1 1 1   Absolute Neutrophils      1 85 - 7 62 Thousands/µL 1 86 1 59 (L) 1 83 (L) 1 87   Immature Grans Absolute      0 00 - 0 20 Thousand/uL 0 02 0 02 0 01 0 02   Lymphocytes Absolute 0 60 - 4 47 Thousands/µL 1 15 0 76 0 89 1 04   Absolute Monocytes      0 17 - 1 22 Thousand/µL 0 46 0 40 0 39 0 47   Absolute Eosinophils      0 00 - 0 61 Thousand/µL 0 13 0 03 0 08 0 10   Basophils Absolute      0 00 - 0 10 Thousands/µL 0 04 0 02 0 03 0 03     Denies any f/c/n/v/cp/ap/sob/cough  Denies diarrhea or skin rash  No known history of HIV or hepatitis  THIS VISIT    Denies any f/c/n/v/cp/ap/sob/cough  Denies diarrhea or skin rash  I have reviewed the relevant past medical, surgical, social and family history  I have also reviewed allergies and medications for this patient  Review of Systems  Review of Systems   All other systems reviewed and are negative  OBJECTIVE     Physical Exam  Vitals:    02/14/22 1321   BP: 132/76   BP Location: Left arm   Pulse: 78   Resp: 18   Temp: (!) 96 8 °F (36 °C)   TempSrc: Tympanic Core   SpO2: 98%   Weight: 94 8 kg (209 lb)   Height: 5' 10 5" (1 791 m)       Physical Exam  Vitals reviewed  Constitutional:       General: He is not in acute distress  Appearance: He is not toxic-appearing  HENT:      Head: Normocephalic and atraumatic  Eyes:      General: No scleral icterus  Extraocular Movements: Extraocular movements intact  Cardiovascular:      Rate and Rhythm: Normal rate  Pulmonary:      Effort: Pulmonary effort is normal  No respiratory distress  Abdominal:      General: There is no distension  Musculoskeletal:         General: Normal range of motion  Cervical back: Normal range of motion and neck supple  Neurological:      General: No focal deficit present  Mental Status: He is alert and oriented to person, place, and time  Mental status is at baseline  Imaging  Relevant imaging reviewed in chart    Labs  Relevant labs reviewed in chart   ASSESSMENT & PLAN      Diagnosis ICD-10-CM Associated Orders   1  Abnormal CBC  R79 89          49 y o  male with isolated mild/moderate leukopenia seen in consultation  In review of labs and understanding clinical history, this lab abnormality has existed since at least July 2013  His differential almost always shows mild elevation in relative monocytes and mild decrease or normal absolute neutrophil count  · Discussion  · Based on the history and lab review, this appears most likely benign given the duration of this WBC with no lab abnormalities to Hb and platelet count  · For comprehensive assessment, I had recommended a bone marrow biopsy  The results are unremarkable  · Hence idiopathic is a possibility for etiology and given the non-malignant nature of it, recommend surveillance  · He told me that he wishes to follow up with his PCP with routine blood work instead of this office  · In respecting his choices, I told him that if anything arises that requires our attention, we are happy to help    Follow Up   PRN      All questions were answered to the patient's satisfaction during this encounter  They appreciated and thanked me for spending time with them  The patient knows the contact information for our office and know to reach out for any relevant concerns related to this encounter  For all other listed problems and medical diagnosis in his chart - they are managed by PCP and/or other specialists, which patient acknowledges  Dr Samantha Toney MD  Hematology & Medical Oncology

## 2022-02-17 LAB — SCAN RESULT: NORMAL

## 2022-04-06 ENCOUNTER — HOSPITAL ENCOUNTER (OUTPATIENT)
Dept: SLEEP CENTER | Facility: CLINIC | Age: 57
Discharge: HOME | End: 2022-04-06
Payer: COMMERCIAL

## 2022-04-06 DIAGNOSIS — R53.83 FATIGUE, UNSPECIFIED TYPE: ICD-10-CM

## 2022-04-06 PROCEDURE — G0399 HOME SLEEP TEST/TYPE 3 PORTA: HCPCS

## 2022-04-07 NOTE — PROGRESS NOTES
Home Sleep Study Documentation    Pre-Sleep Home Study:    Set-up and instructions performed by: PA    Technician performed demonstration for Patient: yes    Return demonstration performed by Patient: yes    Written instructions provided to Patient: yes    Patient signed consent form: yes        Post-Sleep Home Study:    Additional comments by Patient: None    Home Sleep Study Failed:no:    Failure reason: N/A    Reported or Detected: N/A    Scored by: PORTER Devi

## 2022-04-09 PROCEDURE — 95806 SLEEP STUDY UNATT&RESP EFFT: CPT | Performed by: INTERNAL MEDICINE

## 2022-04-20 ENCOUNTER — TELEPHONE (OUTPATIENT)
Dept: SLEEP CENTER | Facility: CLINIC | Age: 57
End: 2022-04-20

## 2022-04-20 NOTE — TELEPHONE ENCOUNTER
Left message for the patient to call back for sleep study results       Moderate MARIAM   Patient needs to schedule consult

## 2022-04-22 NOTE — TELEPHONE ENCOUNTER
Returned the patient's call  He reports that he spoke with with Dr Cullen Mireles   Patient plans to loose weight and then have a repeat sleep study to see if he still has MARIAM   Per patient Dr Shell Walton to manage

## 2022-05-24 ENCOUNTER — OFFICE VISIT (OUTPATIENT)
Dept: FAMILY MEDICINE CLINIC | Facility: CLINIC | Age: 57
End: 2022-05-24
Payer: COMMERCIAL

## 2022-05-24 VITALS
OXYGEN SATURATION: 97 % | WEIGHT: 203 LBS | DIASTOLIC BLOOD PRESSURE: 90 MMHG | TEMPERATURE: 97.6 F | HEIGHT: 71 IN | HEART RATE: 92 BPM | BODY MASS INDEX: 28.42 KG/M2 | SYSTOLIC BLOOD PRESSURE: 132 MMHG

## 2022-05-24 DIAGNOSIS — M54.50 ACUTE BILATERAL LOW BACK PAIN WITHOUT SCIATICA: Primary | ICD-10-CM

## 2022-05-24 PROCEDURE — 1036F TOBACCO NON-USER: CPT | Performed by: FAMILY MEDICINE

## 2022-05-24 PROCEDURE — 3008F BODY MASS INDEX DOCD: CPT | Performed by: FAMILY MEDICINE

## 2022-05-24 PROCEDURE — 99213 OFFICE O/P EST LOW 20 MIN: CPT | Performed by: FAMILY MEDICINE

## 2022-05-24 RX ORDER — METHOCARBAMOL 750 MG/1
750 TABLET, FILM COATED ORAL EVERY 6 HOURS PRN
Qty: 20 TABLET | Refills: 0 | Status: SHIPPED | OUTPATIENT
Start: 2022-05-24

## 2022-05-24 RX ORDER — MELOXICAM 15 MG/1
15 TABLET ORAL DAILY
Qty: 20 TABLET | Refills: 0 | Status: SHIPPED | OUTPATIENT
Start: 2022-05-24

## 2022-05-24 NOTE — PROGRESS NOTES
[facility]      NAME: Joanne Leos  AGE: 49 y o  SEX: male  : 1965   MRN: [id number]    DATE: 2022  TIME: 5:25 PM    Assessment and Plan     Problem List Items Addressed This Visit    None     Visit Diagnoses     Acute bilateral low back pain without sciatica    -  Primary    Relevant Medications    meloxicam (Mobic) 15 mg tablet    methocarbamol (Robaxin-750) 750 mg tablet    Other Relevant Orders    Ambulatory Referral to Comprehensive Spine PT      Recommend moist heat, stretches along with NSAID and muscle relaxant  Referral for comprehensive spine  Return to office in:  P r n  Chief Complaint     Chief Complaint   Patient presents with    Back Pain     Lower back pain over a week        History of Present Illness     Patient complains of low back pain  Symptoms began suddenly last week when bending over at home  Pain is primarily in the low back without radiation  No paresthesias, saddle anesthesia or radicular symptoms  The following portions of the patient's history were reviewed and updated as appropriate: allergies, current medications, past family history, past medical history, past social history, past surgical history and problem list     Review of Systems   Review of Systems   Constitutional: Negative  Respiratory: Negative  Cardiovascular: Negative  Gastrointestinal: Negative  Genitourinary: Negative  Musculoskeletal: Positive for back pain  Psychiatric/Behavioral: Negative          Active Problem List     Patient Active Problem List   Diagnosis    Retinitis pigmentosa    Benign prostatic hyperplasia with weak urinary stream    Onychomycosis    Tinea pedis of both feet    Erectile dysfunction    Vasectomy evaluation    Diarrhea of presumed infectious origin    Abnormal CBC    Obstructive sleep apnea    Fatigue       Objective   /90 (BP Location: Left arm, Patient Position: Sitting, Cuff Size: Standard)   Pulse 92 Temp 97 6 °F (36 4 °C) (Tympanic)   Ht 5' 10 5" (1 791 m)   Wt 92 1 kg (203 lb)   SpO2 97%   BMI 28 72 kg/m²     Physical Exam  Musculoskeletal:      Comments: Decreased range of motion lumbar spine with mild spasm             Current Medications     Current Outpatient Medications:     cholecalciferol (VITAMIN D3) 1,000 units tablet, Take 1,000 Units by mouth daily, Disp: , Rfl:     fluticasone (FLONASE) 50 mcg/act nasal spray, 1 spray into each nostril daily, Disp: , Rfl:     ibuprofen (MOTRIN) 600 mg tablet, Take 600 mg by mouth every 6 (six) hours as needed for mild pain, Disp: , Rfl:     Lutein 20 MG CAPS, Take 20 mg by mouth in the morning  , Disp: , Rfl:     meloxicam (Mobic) 15 mg tablet, Take 1 tablet (15 mg total) by mouth in the morning , Disp: 20 tablet, Rfl: 0    methocarbamol (Robaxin-750) 750 mg tablet, Take 1 tablet (750 mg total) by mouth every 6 (six) hours as needed for muscle spasms, Disp: 20 tablet, Rfl: 0    Omega-3 Fatty Acids (FISH OIL PO), Take 1 tablet by mouth in the morning  , Disp: , Rfl:     sildenafil (VIAGRA) 100 mg tablet, TAKE 1 TABLET BY MOUTH EVERY DAY AS NEEDED FOR ERECTILE DYSFUNCTION, Disp: 6 tablet, Rfl: 1    Health Maintenance     Health Maintenance   Topic Date Due    HIV Screening  Never done    DTaP,Tdap,and Td Vaccines (1 - Tdap) Never done    COVID-19 Vaccine (3 - Booster for Moderna series) 09/14/2021    Depression Screening  06/30/2022    BMI: Followup Plan  12/10/2022    Annual Physical  12/10/2022    Colorectal Cancer Screening  01/27/2023    BMI: Adult  05/24/2023    Hepatitis C Screening  Completed    Influenza Vaccine  Completed    Pneumococcal Vaccine: Pediatrics (0 to 5 Years) and At-Risk Patients (6 to 59 Years)  Aged Out    HIB Vaccine  Aged Out    Hepatitis B Vaccine  Aged Out    IPV Vaccine  Aged Out    Hepatitis A Vaccine  Aged Out    Meningococcal ACWY Vaccine  Aged Out    HPV Vaccine  Aged [employer] History Administered Date(s) Administered    COVID-19 MODERNA VACC 0 5 ML IM 03/15/2021, 04/14/2021    INFLUENZA 09/15/2017, 09/22/2018, 09/11/2019, 09/11/2019    Influenza, injectable, quadrivalent, preservative free 0 5 mL 09/11/2019    Influenza, recombinant, quadrivalent,injectable, preservative free 12/10/2021    Influenza, seasonal, injectable, preservative free 10/03/2015, 09/22/2018    Tuberculin Skin Test-PPD Intradermal 03/08/2019       Bigg Graham DO  [facility]

## 2022-05-25 ENCOUNTER — EVALUATION (OUTPATIENT)
Dept: PHYSICAL THERAPY | Facility: CLINIC | Age: 57
End: 2022-05-25
Payer: COMMERCIAL

## 2022-05-25 VITALS
HEART RATE: 78 BPM | DIASTOLIC BLOOD PRESSURE: 88 MMHG | OXYGEN SATURATION: 98 % | SYSTOLIC BLOOD PRESSURE: 120 MMHG | TEMPERATURE: 98.7 F

## 2022-05-25 DIAGNOSIS — M54.50 ACUTE BILATERAL LOW BACK PAIN WITHOUT SCIATICA: ICD-10-CM

## 2022-05-25 PROCEDURE — 97161 PT EVAL LOW COMPLEX 20 MIN: CPT | Performed by: PHYSICAL THERAPIST

## 2022-05-25 PROCEDURE — G0283 ELEC STIM OTHER THAN WOUND: HCPCS | Performed by: PHYSICAL THERAPIST

## 2022-05-25 PROCEDURE — 97014 ELECTRIC STIMULATION THERAPY: CPT | Performed by: PHYSICAL THERAPIST

## 2022-05-25 NOTE — PROGRESS NOTES
PT Evaluation     Today's date: 2022  Patient name: Lauren Joe  : 1965  MRN: [id number]  Referring provider: Justin Joe DO  Dx:   Encounter Diagnosis     ICD-10-CM    1  Acute bilateral low back pain without sciatica  M54 50 Ambulatory Referral to Comprehensive Spine PT                  Assessment  Assessment details: Patient is a 49 y o  male who  presents with pain, range of motion loss, weakness associated with a diagnosis of an acute lumbar strain  He has functional limitations as a result of impairments  Patient would benefit from course of skilled physical therapy to address above listed impairments in an effort to improve function  Understanding of Dx/Px/POC: excellentPrognosis details: Score 4   Psych Score 2   Risk category 2 : Medium         Goals  Short Term Goals:  1) Pain : Decrease low back pain to 2/10 at worst x 1 continuous week within 1-2 weeks  2) ROM: Improve lumbar ROM by at least 25% for all noted as limited up to full within 1-2  weeks  3) Strength: Improved score on deep core muscle contraction scale by at least  1 point within 1-2 weeks  4) Flexibility: Improved 90-90 Hamstring flexibility by at least 6 degrees within 1-2 weeks  5) Function: Improved FOTO score from IE within 2-3 weeks (56 @ IE)  patient to note greater ease with activities of daily living within 1-2 weeks  LongTerm Goals:  1) Pain : Eliminate low back pain x 1 continuous week within 2-4 weeks  2) ROM: Improve lumbar ROM to full within 2-4 weeks  3) Strength:  Improved score on deep core muscle contraction scale to 10  within 2-4 weeks  4) Flexibility: Improved 90-90 Hamstring flexibility by at least 10 degrees within 2-4 weeks  5) Function: Improved FOTO score to at least  81 , no difficulty with ADLs as they relate to low back pain within 2-4 weeks  6) Independent with home exercise program within 2-4 weeks        Plan  Patient would benefit from: skilled PT  Planned modality interventions: biofeedback, cryotherapy, TENS, thermotherapy: hydrocollator packs, ultrasound and low level laser therapy  Planned therapy interventions: abdominal trunk stabilization, body mechanics training, breathing training, flexibility, functional ROM exercises, gait training, home exercise program, joint mobilization, manual therapy, neuromuscular re-education, patient education, self care, strengthening, stretching, therapeutic activities, therapeutic exercise and therapeutic training  Frequency: 1x week (1x's per week x 2-4 weeks)  Treatment plan discussed with: patient        Subjective Evaluation    History of Present Illness  Mechanism of injury: Patient is a 49 y o  old male, referred via his PCP for initial physical therapy consultation as part of our comprehensive spine program        Reports his low back seized up after forward bending last 22 and hasn't loosened up much since that time  Consulted with PCP yesterday  Was referred for course of therapy and also prescribed meloxicam and methocarbamol   Took one methocarbomol last night, was helpful  Sits for work, sitting tends to make him more stiff  Rolling in bed also  specifically aggravating  Will get some spasming  Walking eases symptoms at least temporaliry  Denies LE paresthesias, denies bowel or bladder dysfunction    Pain  Current pain rating: 3  At worst pain ratin    Social Support    Employment status: working  Exercise history: enjoys playing volleyball      Diagnostic Tests  No diagnostic tests performed  Patient Goals  Patient goals for therapy: decreased pain, increased strength, increased motion and return to sport/leisure activities          Objective     General Comments:      Lumbar Comments  Lumbar AROM:  Flexion: 50%, pain in lower lumbar spine    Extension:100%  R SB: 75%  L SB: 75%  R ROT:100%  L ROT:100%    Repeated Testing:  Flexion: Increased tightness in hamstring  Extension: No change/no pain      LE Myotomes:  5/5 bilaterally    Pt is able to perform heel raise    Special Testing:  SLUMP: (-) bilaterally    LE Flexibility  90-90 Hamstrings:  R: 45  L:45    Segmental Mobility: Hypomobility L4/L5 leves    Palpation:Mild tenderness L L4/L5 paraspinals      LE sensation:intact      Core strength:  Deep Core Muscle Contraction Scale Score: 8/10  Deep Muscle Contraction Scale :   Quality of contraction:3  No contraction : 0  Rapid, superficial contraction: 1  Just perceptible contraction: 2  Gentle, slow contraction: 3    Substitution:2  Resting substitution:0  Moderate to strong substitution:1  Subtle perceptible substitution:2  No substitution:3      Symmetry:2  Unilateral contraction:0  Bilateral but asymmetrical contraction:1  Symmetrical contraction:2     Breathin  Inability or difficulty with breathing during contraction: 0  Able to hold contraction while maintaining breathin    Holdin  Holding < 10 seconds:  0  Holding > 10 seconds: 1      Gait: Slow, guarded posturing             Precautions: none      Manuals 5/25            Manual B HS stretch NV            P/A glides L4, L5 (III/IV) RF                                      Neuro Re-Ed             TA with ball squeeze NV            TA with hooklying tband abd Nv            TA with bridge NV                                                                Ther Ex             Prone press ups NV                                                                                                       Ther Activity                                       Gait Training                                       Modalities             MH/TENS bilateral lumbar paraspinals  Hz  X 10 min            IE/HEP RG

## 2022-06-01 ENCOUNTER — OFFICE VISIT (OUTPATIENT)
Dept: PHYSICAL THERAPY | Facility: CLINIC | Age: 57
End: 2022-06-01
Payer: COMMERCIAL

## 2022-06-01 DIAGNOSIS — M54.50 ACUTE BILATERAL LOW BACK PAIN WITHOUT SCIATICA: Primary | ICD-10-CM

## 2022-06-01 PROCEDURE — 97112 NEUROMUSCULAR REEDUCATION: CPT | Performed by: PHYSICAL THERAPIST

## 2022-06-01 PROCEDURE — 97140 MANUAL THERAPY 1/> REGIONS: CPT | Performed by: PHYSICAL THERAPIST

## 2022-06-01 NOTE — PROGRESS NOTES
Daily Note     Today's date: 2022  Patient name: Billy Cartwright  : 1965  MRN: [id number]  Referring provider: Jo Dukes DO  Dx:   Encounter Diagnosis     ICD-10-CM    1  Acute bilateral low back pain without sciatica  M54 52                   Subjective: Low back feeling better, but not "100%," Now can put underwaear and socks without sitting down  Feeling pain and  stiffness  Taking one meloxicam/day in the AM, using mm relaxer at night  When he does stretches/trunk rotation feels glutes on opposite side are fighting him  Objective: See treatment diary below  Progressed dynamic  core strengthening program     90-90 Hamstrings: via manual assessment  R:  30  L:35    Segmental mobility: mild hypermobility L3 level with spring testing    Assessment: Tolerated treatment well  Core contraction is strong, some difficulty maintaining with eccentric LE activation  Making progress  Plan: Continue per plan of care        Precautions: none      Manuals            Manual B HS stretch NV RG           P/A glides L4, L5 (III/IV) RF L3, L4, L5  RG           Manual bilateral piriformis stretch  RG                        Neuro Re-Ed             TA with ball squeeze NV 5sec x 20           TA with hooklying tband abd Nv Purple  5sec x 20           TA with bridge NV 3sec x 20           TA with bilat hip flexion  5sec x 10                                                  Ther Ex             Prone press ups NV 10sec x 10           DKTC  10sec x 10                                                                                         Ther Activity                                       Gait Training                                       Modalities             MH/TENS bilateral lumbar paraspinals  Hz  X 10 min NP           IE/HEP RG

## 2022-06-15 ENCOUNTER — OFFICE VISIT (OUTPATIENT)
Dept: PHYSICAL THERAPY | Facility: CLINIC | Age: 57
End: 2022-06-15
Payer: COMMERCIAL

## 2022-06-15 DIAGNOSIS — M54.50 ACUTE BILATERAL LOW BACK PAIN WITHOUT SCIATICA: Primary | ICD-10-CM

## 2022-06-15 PROCEDURE — 97112 NEUROMUSCULAR REEDUCATION: CPT | Performed by: PHYSICAL THERAPIST

## 2022-06-15 PROCEDURE — 97140 MANUAL THERAPY 1/> REGIONS: CPT | Performed by: PHYSICAL THERAPIST

## 2022-06-15 NOTE — PROGRESS NOTES
Discharge     Today's date: 6/15/2022  Patient name: Mark Stein  : 1965  MRN: [id number]  Referring provider: Igor Dunham DO  Dx:   Encounter Diagnosis     ICD-10-CM    1  Acute bilateral low back pain without sciatica  M54 52                      Assessment  Assessment details: Patient is a 49 y o  male who  presented to physical therapy initially on 22   with pain, range of motion loss, weakness associated with a diagnosis of an acute lumbar strain  Since starting therapy pt has made the following progress towards goals:    1) Decreased pain  2) Improved range of motion  3) Improved flexibility  3) Improved deep core strength  4) Improved self rated functional score (FOTO score improved to 83 from 56 @ IE)  Has made good progress, is functioning well and is ready for discharge  Goals  Short Term Goals:  1) Pain : Decrease low back pain to 2/10 at worst x 1 continuous week within 1-2 weeks  -met  2) ROM: Improve lumbar ROM by at least 25% for all noted as limited up to full within 1-2  weeks  -met  3) Strength: Improved score on deep core muscle contraction scale by at least  1 point within 1-2 weeks  -met  4) Flexibility: Improved 90-90 Hamstring flexibility by at least 6 degrees within 1-2 weeks  -met  5) Function: Improved FOTO score from IE within 2-3 weeks (56 @ IE)  patient to note greater ease with activities of daily living within 1-2 weeks  -met    LongTerm Goals:  1) Pain : Eliminate low back pain x 1 continuous week within 2-4 weeks  -not yet met  2) ROM: Improve lumbar ROM to full within 2-4 weeks  -   3) Strength:  Improved score on deep core muscle contraction scale to 10  within 2-4 weeks  -met  4) Flexibility: Improved 90-90 Hamstring flexibility by at least 10 degrees within 2-4 weeks  -met  5) Function: Improved FOTO score to at least  81 , no difficulty with ADLs as they relate to low back pain within 2-4 weeks-met  6) Independent with home exercise program within 2-4 weeks -met     Plan  Advised patient to continue with home exercise program which I reviewed with them today  Advised patient to contact me with any future questions or concerns regarding exercise  Pt is in agreement with discharge plan      Subjective Evaluation     History of Present Illness  Mechanism of injury: Patient is a 49 y o  old male, referred via his PCP for initial physical therapy consultation as part of our comprehensive spine program        Reports his low back seized up after forward bending last 22 and hasn't loosened up much since that time  Consulted with PCP yesterday  Was referred for course of therapy and also prescribed meloxicam and methocarbamol   Took one methocarbomol last night, was helpful  Sits for work, sitting tends to make him more stiff  Rolling in bed also  specifically aggravating  Will get some spasming  Walking eases symptoms at least temporaliry  UPDATED 6/15/22: Overall noting less low back pain and tightness since starting therapy  Has been doing HEP  Felt some tightness after  volleyball last week  Used log splitter yesterday without any worsening symptoms today  Denies LE paresthesias, denies bowel or bladder dysfunction  Pain  Current pain ratin  At worst pain ratin     Social Support     Employment status: working  Exercise history: enjoys playing volleyball       Diagnostic Tests  No diagnostic tests performed  Patient Goals: Has made good progress towards personal goals  Patient goals for therapy: decreased pain, increased strength, increased motion and return to sport/leisure activities              Objective      General Comments:        Lumbar Comments  Lumbar AROM:  Flexion: 90%, pulling in hamstrings   Extension:100%    R ROT:100%  L ROT:100%             LE Myotomes:  5/5 bilaterally     LE Flexibility  90-90 Hamstrings:  R: 25 degrees  L:28 degrees     Segmental Mobility Lumbar Spine:  WNL             Core strength:  Deep Core Muscle Contraction Scale Score: 10/10  Deep Muscle Contraction Scale :   Quality of contraction:3  No contraction : 0  Rapid, superficial contraction: 1  Just perceptible contraction: 2  Gentle, slow contraction: 3     Substitution:3  Resting substitution:0  Moderate to strong substitution:1  Subtle perceptible substitution:2  No substitution:3        Symmetry:2  Unilateral contraction:0  Bilateral but asymmetrical contraction:1  Symmetrical contraction:2      Breathin  Inability or difficulty with breathing during contraction: 0  Able to hold contraction while maintaining breathin     Holdin  Holding < 10 seconds:  0  Holding > 10 seconds: 1        Gait: WNL       FPrecautions: none      Manuals 5/25 6/1 6/15          Manual B HS stretch NV RG RG          P/A glides L4, L5 (III/IV) RF L3, L4, L5  RG L3, L4, L5 then mobilization with movement with press up L4/L5 10sec x 3 each          Manual bilateral piriformis stretch  RG RG                       Neuro Re-Ed             TA with ball squeeze NV 5sec x 20           TA with hooklying tband abd Nv Purple  5sec x 20           TA with bridge NV 3sec x 20 3sec x 10          TA with bilat hip flexion  5sec x 10           TA with SLR Flexion   3sec x 10 each          TA wih bridge and SLR flexion   3sec x 10 each          TA contraction with hold   10sec  2 x 10          Antirotation press   Green  5sec  2 x 10          Ther Ex             Prone press ups NV 10sec x 10 10sec x 5          DKTC  10sec x 10 10sec x 10                                                                                        Ther Activity                                       Gait Training                                       Modalities             MH/TENS bilateral lumbar paraspinals  Hz  X 10 min NP           IE/HEP RG

## 2022-08-22 DIAGNOSIS — N52.9 ERECTILE DYSFUNCTION, UNSPECIFIED ERECTILE DYSFUNCTION TYPE: ICD-10-CM

## 2022-08-22 RX ORDER — SILDENAFIL 100 MG/1
100 TABLET, FILM COATED ORAL AS NEEDED
Qty: 6 TABLET | Refills: 3 | Status: SHIPPED | OUTPATIENT
Start: 2022-08-22

## 2022-10-12 PROBLEM — R19.7 DIARRHEA OF PRESUMED INFECTIOUS ORIGIN: Status: RESOLVED | Noted: 2021-06-30 | Resolved: 2022-10-12

## 2022-11-11 ENCOUNTER — TELEMEDICINE (OUTPATIENT)
Dept: FAMILY MEDICINE CLINIC | Facility: CLINIC | Age: 57
End: 2022-11-11

## 2022-11-11 DIAGNOSIS — U07.1 COVID-19: Primary | ICD-10-CM

## 2022-11-11 NOTE — PROGRESS NOTES
COVID-19 Outpatient Progress Note    Assessment/Plan:    Problem List Items Addressed This Visit        Other    COVID-19 - Primary     Discussed tx options with patient; overall improving; advised on supportive care; declined Paxlovid; advised on OTC symptom relief; reviewed quarantine guidelines              Disposition:     Patient has asymptomatic or mild COVID-19 infection  Based off CDC guidelines, they were recommended to isolate for 5 days  If they are asymptomatic or symptoms are improving with no fevers in the past 24 hours, isolation may be ended followed by 5 days of wearing a mask when around othes to minimize risk of infecting others  If still have a fever or other symptoms have not improved, continue to isolate until they improve  Regardless of when they end isolation, avoid being around people who are more likely to get very sick from COVID-19 until at least day 11  Discussed symptom directed medication options with patient  Discussed vitamin D, vitamin C, and/or zinc supplementation with patient  I have spent 9 minutes directly with the patient  Greater than 50% of this time was spent in counseling/coordination of care regarding: diagnostic results, prognosis, risks and benefits of treatment options, instructions for management, patient and family education, importance of treatment compliance, risk factor reductions and impressions  Encounter provider: Chantale Albrecht DO     Provider located at: [address] [facility] [address]  [address] [facility] [address]  [address] [place] [address]  [phone]     Recent Visits  No visits were found meeting these conditions    Showing recent visits within past 7 days and meeting all other requirements  Today's Visits  Date Type Provider Dept   11/11/22 Telemedicine Chantale Albrecht DO [id number] [address] today's visits and meeting all other requirements  Future Appointments  No visits were found meeting these conditions  Showing future appointments within next 150 days and meeting all other requirements     This virtual check-in was done via [address] and patient was informed that this is a secure, HIPAA-compliant platform  He agrees to proceed  Patient agrees to participate in a virtual check in via telephone or video visit instead of presenting to the office to address urgent/immediate medical needs  Patient is aware this is a billable service  He acknowledged consent and understanding of privacy and security of the video platform  The patient has agreed to participate and understands they can discontinue the visit at any time  After connecting through [facility], the patient was identified by name and date of birth  Adriel Land was informed that this was a telemedicine visit and that the exam was being conducted confidentially over secure lines  My office door was closed  No one else was in the room  Adriel Land acknowledged consent and understanding of privacy and security of the telemedicine visit  I informed the patient that I have reviewed his record in Epic and presented the opportunity for him to ask any questions regarding the visit today  The patient agreed to participate  Verification of patient location:  Patient is located in the following state in which I hold an active license: [place]    Subjective:   Adriel Land is a 56 y o  male who has been screened for COVID-19  Symptom change since last report: improving  Patient's symptoms include fever, chills, fatigue, nasal congestion, cough, myalgias and headache  Patient denies shortness of breath, chest tightness, vomiting and diarrhea  - Date of symptom onset: 11/7/2022  - Date of positive COVID-19 test: 11/10/2022  Type of test: Home antigen  Patient with typical symptoms of COVID-19 and they attest that they were positive on home rapid antigen testing  Image of positive result is not able to be uploaded into their chart  COVID-19 vaccination status: Fully vaccinated (primary series)    Lab Results   Component Value Date    SARSCOV2 Not Detected 11/29/2020       Review of Systems   Constitutional: Positive for chills, fatigue and fever  HENT: Positive for congestion  Respiratory: Positive for cough  Negative for chest tightness and shortness of breath  Gastrointestinal: Negative for diarrhea and vomiting  Musculoskeletal: Positive for myalgias  Neurological: Positive for headaches  Current Outpatient Medications on File Prior to Visit   Medication Sig   • cholecalciferol (VITAMIN D3) 1,000 units tablet Take 1,000 Units by mouth daily   • fluticasone (FLONASE) 50 mcg/act nasal spray 1 spray into each nostril daily   • ibuprofen (MOTRIN) 600 mg tablet Take 600 mg by mouth every 6 (six) hours as needed for mild pain   • Lutein 20 MG CAPS Take 20 mg by mouth in the morning     • meloxicam (Mobic) 15 mg tablet Take 1 tablet (15 mg total) by mouth in the morning  • methocarbamol (Robaxin-750) 750 mg tablet Take 1 tablet (750 mg total) by mouth every 6 (six) hours as needed for muscle spasms   • Omega-3 Fatty Acids (FISH OIL PO) Take 1 tablet by mouth in the morning     • sildenafil (VIAGRA) 100 mg tablet Take 1 tablet (100 mg total) by mouth as needed for erectile dysfunction       Objective: There were no vitals taken for this visit  Physical Exam  Constitutional:       General: He is not in acute distress  Appearance: Normal appearance  He is not ill-appearing, toxic-appearing or diaphoretic  HENT:      Head: Normocephalic and atraumatic  Pulmonary:      Effort: Pulmonary effort is normal    Neurological:      General: No focal deficit present  Mental Status: He is alert and oriented to person, place, and time  Psychiatric:         Mood and Affect: Mood normal          Behavior: Behavior normal          Thought Content:  Thought content normal          Judgment: Judgment normal        Jazlyn Cedeño Dia Obrien

## 2022-11-11 NOTE — ASSESSMENT & PLAN NOTE
Discussed tx options with patient; overall improving; advised on supportive care; declined Paxlovid; advised on OTC symptom relief; reviewed quarantine guidelines

## 2023-02-10 ENCOUNTER — APPOINTMENT (OUTPATIENT)
Dept: LAB | Facility: CLINIC | Age: 58
End: 2023-02-10

## 2023-02-10 ENCOUNTER — OFFICE VISIT (OUTPATIENT)
Dept: FAMILY MEDICINE CLINIC | Facility: CLINIC | Age: 58
End: 2023-02-10

## 2023-02-10 VITALS
HEART RATE: 78 BPM | DIASTOLIC BLOOD PRESSURE: 80 MMHG | TEMPERATURE: 97.8 F | SYSTOLIC BLOOD PRESSURE: 114 MMHG | BODY MASS INDEX: 27.94 KG/M2 | HEIGHT: 71 IN | WEIGHT: 199.6 LBS | OXYGEN SATURATION: 96 %

## 2023-02-10 DIAGNOSIS — Z12.5 SCREENING FOR PROSTATE CANCER: ICD-10-CM

## 2023-02-10 DIAGNOSIS — Z12.11 SCREEN FOR COLON CANCER: ICD-10-CM

## 2023-02-10 DIAGNOSIS — N40.1 BENIGN PROSTATIC HYPERPLASIA WITH WEAK URINARY STREAM: ICD-10-CM

## 2023-02-10 DIAGNOSIS — Z00.00 ANNUAL PHYSICAL EXAM: Primary | ICD-10-CM

## 2023-02-10 DIAGNOSIS — R39.12 BENIGN PROSTATIC HYPERPLASIA WITH WEAK URINARY STREAM: ICD-10-CM

## 2023-02-10 DIAGNOSIS — R07.9 CHEST PAIN, UNSPECIFIED TYPE: ICD-10-CM

## 2023-02-10 DIAGNOSIS — K63.5 POLYP OF COLON, UNSPECIFIED PART OF COLON, UNSPECIFIED TYPE: ICD-10-CM

## 2023-02-10 DIAGNOSIS — Z23 NEED FOR VACCINATION: ICD-10-CM

## 2023-02-10 LAB — PSA SERPL-MCNC: 1.8 NG/ML (ref 0–4)

## 2023-02-10 RX ORDER — LEVOTHYROXINE SODIUM 0.03 MG/1
25 TABLET ORAL WEEKLY
COMMUNITY

## 2023-02-10 NOTE — PROGRESS NOTES
ADULT ANNUAL [address] [place] [address] [facility]    NAME: Whitney Danielson  AGE: 56 y o  SEX: male  : 1965     DATE: 2/10/2023     Assessment and Plan:     Patient was seen for annual physical exam   Overall he appears to be in good health  His exam was unremarkable  He was working with another physician and is receiving testosterone replacement as well as other supplements  We reviewed immunizations and will give him at Tdap today  He will defer shingles vaccine but plans to return  He will be referred to urology for some mild BPH symptoms  He had tried Flomax in the past but had side effects from the medication  He is due for colonoscopy and will be referred for that as well  His routine blood work is up-to-date with the exception of PSA which was ordered today  Problem List Items Addressed This Visit     Benign prostatic hyperplasia with weak urinary stream    Relevant Orders    Ambulatory Referral to Urology   Other Visit Diagnoses     Annual physical exam    -  Primary    Polyp of colon, unspecified part of colon, unspecified type        Relevant Orders    Ambulatory referral for colonoscopy    Screen for colon cancer        Relevant Orders    Ambulatory referral for colonoscopy    Screening for prostate cancer        Relevant Orders    PSA, Total Screen    Need for vaccination        Relevant Orders    TDAP VACCINE GREATER THAN OR EQUAL TO 8YO IM (Completed)    Chest pain, unspecified type              Immunizations and preventive care screenings were discussed with patient today  Appropriate education was printed on patient's after visit summary  Discussed risks and benefits of prostate cancer screening  We discussed the controversial history of PSA screening for prostate cancer in the [place] as well as the risk of over detection and over treatment of prostate cancer by way of PSA screening    The patient understands that PSA blood testing is an imperfect way to screen for prostate cancer and that elevated PSA levels in the blood may also be caused by infection, inflammation, prostatic trauma or manipulation, urological procedures, or by benign prostatic enlargement  The role of the digital rectal examination in prostate cancer screening was also discussed and I discussed with him that there is large interobserver variability in the findings of digital rectal examination  Counseling:  Alcohol/drug use: discussed moderation in alcohol intake, the recommendations for healthy alcohol use, and avoidance of illicit drug use  Dental Health: discussed importance of regular tooth brushing, flossing, and dental visits  Injury prevention: discussed safety/seat belts, safety helmets, smoke detectors, carbon dioxide detectors, and smoking near bedding or upholstery  Exercise: the importance of regular exercise/physical activity was discussed  Recommend exercise 3-5 times per week for at least 30 minutes  BMI Counseling: Body mass index is 27 84 kg/m²  The BMI is above normal  Nutrition recommendations include encouraging healthy choices of fruits and vegetables, moderation in carbohydrate intake and increasing intake of lean protein  Exercise recommendations include vigorous physical activity 75 minutes/week and exercising 3-5 times per week  No pharmacotherapy was ordered  Rationale for BMI follow-up plan is due to patient being overweight or obese  Depression Screening and Follow-up Plan: Patient was screened for depression during today's encounter  They screened negative with a PHQ-2 score of 0  Return in about 1 year (around 2/10/2024), or if symptoms worsen or fail to improve       Chief Complaint:     Chief Complaint   Patient presents with   • Physical Exam     Patient is here today for his yearly physical exam       History of Present Illness:     Adult Annual Physical   Patient here for a comprehensive physical exam  The patient reports no problems  Diet and Physical Activity  Diet/Nutrition: well balanced diet and consuming 3-5 servings of fruits/vegetables daily  Exercise: moderate cardiovascular exercise and strength training exercises  Depression Screening  PHQ-2/9 Depression Screening    Little interest or pleasure in doing things: 0 - not at all  Feeling down, depressed, or hopeless: 0 - not at all  PHQ-2 Score: 0  PHQ-2 Interpretation: Negative depression screen       General Health  Sleep: sleeps well and snores loudly  Hearing: normal - bilateral   Vision: goes for regular eye exams, most recent eye exam <1 year ago and wears glasses  Dental: regular dental visits   Health  Symptoms include: urinary frequency and urinary hesitancy     Review of Systems:     Review of Systems   Constitutional: Negative  Respiratory: Negative  Cardiovascular: Negative  Gastrointestinal: Negative  Genitourinary: Negative  Musculoskeletal: Negative  Psychiatric/Behavioral: Negative         Past Medical History:     Past Medical History:   Diagnosis Date   • BPH (benign prostatic hyperplasia)    • Retinitis pigmentosa       Past Surgical History:     Past Surgical History:   Procedure Laterality Date   • IR BIOPSY BONE MARROW  1/31/2022   • NASAL SEPTUM SURGERY  1985   • NERVE REPAIR Left 2000    L/thumb      Family History:     Family History   Problem Relation Age of Onset   • Hypertension Family    • Other Family    • Heart disease Mother    • Cancer Father       Social History:     Social History     Socioeconomic History   • Marital status: /Civil Union     Spouse name: None   • Number of children: None   • Years of education: None   • Highest education level: None   Occupational History   • None   Tobacco Use   • Smoking status: Never     Passive exposure: Never   • Smokeless tobacco: Never   Vaping Use   • Vaping Use: Never used   Substance and Sexual Activity   • Alcohol use: Yes     Comment: casual   • Drug use: Never   • Sexual activity: Yes     Partners: Female   Other Topics Concern   • None   Social History Narrative   • None     Social Determinants of Health     Financial Resource Strain: Low Risk    • Difficulty of Paying Living Expenses: Not hard at all   Food Insecurity: No Food Insecurity   • Worried About Running Out of Food in the Last Year: Never true   • Ran Out of Food in the Last Year: Never true   Transportation Needs: No Transportation Needs   • Lack of Transportation (Medical): No   • Lack of Transportation (Non-Medical): No   Physical Activity: Sufficiently Active   • Days of Exercise per Week: 6 days   • Minutes of Exercise per Session: 40 min   Stress: No Stress Concern Present   • Feeling of Stress : Not at all   Social Connections:  Moderately Isolated   • Frequency of Communication with Friends and Family: More than three times a week   • Frequency of Social Gatherings with Friends and Family: More than three times a week   • Attends Buddhism Services: Never   • Active Member of Clubs or Organizations: No   • Attends Club or Organization Meetings: Never   • Marital Status:    Intimate Partner Violence: Not At Risk   • Fear of Current or Ex-Partner: No   • Emotionally Abused: No   • Physically Abused: No   • Sexually Abused: No   Housing Stability: Unknown   • Unable to Pay for Housing in the Last Year: No   • Number of Places Lived in the Last Year: Not on file   • Unstable Housing in the Last Year: No      Current Medications:     Current Outpatient Medications   Medication Sig Dispense Refill   • cholecalciferol (VITAMIN D3) 1,000 units tablet Take 1,000 Units by mouth daily     • ibuprofen (MOTRIN) 600 mg tablet Take 600 mg by mouth every 6 (six) hours as needed for mild pain     • levothyroxine 25 mcg tablet Take 25 mcg by mouth once a week     • Lutein 20 MG CAPS Take 20 mg by mouth in the morning       • metFORMIN (GLUCOPHAGE) 500 mg tablet Take 500 mg by mouth daily with breakfast     • NON FORMULARY Biote DIM SGS+     • Omega-3 Fatty Acids (FISH OIL PO) Take 1 tablet by mouth in the morning       • sildenafil (VIAGRA) 100 mg tablet Take 1 tablet (100 mg total) by mouth as needed for erectile dysfunction 6 tablet 3   • Vitamins A & D (VITAMIN A & D PO) Take by mouth Vitamin ADK       No current facility-administered medications for this visit  Allergies:     No Known Allergies   Physical Exam:     /80 (BP Location: Left arm, Patient Position: Sitting, Cuff Size: Large)   Pulse 78   Temp 97 8 °F (36 6 °C) (Tympanic)   Ht 5' 11" (1 803 m)   Wt 90 5 kg (199 lb 9 6 oz)   SpO2 96%   BMI 27 84 kg/m²     Physical Exam  Vitals and nursing note reviewed  Constitutional:       Appearance: He is well-developed  HENT:      Head: Normocephalic  Right Ear: External ear normal       Left Ear: External ear normal       Nose: Nose normal    Eyes:      Conjunctiva/sclera: Conjunctivae normal       Pupils: Pupils are equal, round, and reactive to light  Cardiovascular:      Rate and Rhythm: Normal rate and regular rhythm  Heart sounds: Normal heart sounds  Pulmonary:      Effort: Pulmonary effort is normal       Breath sounds: Normal breath sounds  Abdominal:      General: Bowel sounds are normal       Palpations: Abdomen is soft  Musculoskeletal:         General: Normal range of motion  Cervical back: Normal range of motion and neck supple  Skin:     General: Skin is warm and dry  Psychiatric:         Behavior: Behavior normal          Thought Content:  Thought content normal          Judgment: Judgment normal           Cathy Allred, DO  [address] [facility]

## 2023-03-20 ENCOUNTER — TELEPHONE (OUTPATIENT)
Dept: GASTROENTEROLOGY | Facility: CLINIC | Age: 58
End: 2023-03-20

## 2023-03-20 ENCOUNTER — PREP FOR PROCEDURE (OUTPATIENT)
Dept: GASTROENTEROLOGY | Facility: CLINIC | Age: 58
End: 2023-03-20

## 2023-03-20 DIAGNOSIS — Z86.010 HISTORY OF COLON POLYPS: Primary | ICD-10-CM

## 2023-03-20 NOTE — TELEPHONE ENCOUNTER
Scheduled date of colonoscopy (as of today):  05/10/2023    Physician performing colonoscopy: Dr Mcnela    Location of colonoscopy: [facility]    Clearances:

## 2023-04-26 DIAGNOSIS — Z86.010 HISTORY OF COLON POLYPS: Primary | ICD-10-CM

## 2023-05-08 RX ORDER — SODIUM CHLORIDE 9 MG/ML
125 INJECTION, SOLUTION INTRAVENOUS CONTINUOUS
Status: CANCELLED | OUTPATIENT
Start: 2023-05-08

## 2023-05-09 ENCOUNTER — ANESTHESIA (OUTPATIENT)
Dept: ANESTHESIOLOGY | Facility: HOSPITAL | Age: 58
End: 2023-05-09

## 2023-05-09 ENCOUNTER — ANESTHESIA EVENT (OUTPATIENT)
Dept: ANESTHESIOLOGY | Facility: HOSPITAL | Age: 58
End: 2023-05-09

## 2023-05-09 NOTE — ANESTHESIA PREPROCEDURE EVALUATION
Procedure:  PRE-OP ONLY    Relevant Problems   ANESTHESIA (within normal limits)      CARDIO (within normal limits)      ENDO (within normal limits)      GI/HEPATIC (within normal limits)      /RENAL (within normal limits)      GYN (within normal limits)      HEMATOLOGY (within normal limits)      MUSCULOSKELETAL (within normal limits)      NEURO/PSYCH (within normal limits)      PULMONARY   (+) Obstructive sleep apnea             Anesthesia Plan  ASA Score- 2     Anesthesia Type- IV sedation with anesthesia with ASA Monitors  Additional Monitors:   Airway Plan:           Plan Factors-Exercise tolerance (METS): >4 METS  Chart reviewed  EKG reviewed  Existing labs reviewed  Patient summary reviewed  Patient is not a current smoker  Induction- intravenous  Postoperative Plan-     Informed Consent- Anesthetic plan and risks discussed with patient

## 2023-05-10 ENCOUNTER — HOSPITAL ENCOUNTER (OUTPATIENT)
Dept: GASTROENTEROLOGY | Facility: MEDICAL CENTER | Age: 58
Setting detail: OUTPATIENT SURGERY
Discharge: HOME | End: 2023-05-10
Attending: INTERNAL MEDICINE

## 2023-05-10 ENCOUNTER — ANESTHESIA EVENT (OUTPATIENT)
Dept: GASTROENTEROLOGY | Facility: MEDICAL CENTER | Age: 58
End: 2023-05-10

## 2023-05-10 ENCOUNTER — ANESTHESIA (OUTPATIENT)
Dept: GASTROENTEROLOGY | Facility: MEDICAL CENTER | Age: 58
End: 2023-05-10

## 2023-05-10 VITALS
DIASTOLIC BLOOD PRESSURE: 56 MMHG | RESPIRATION RATE: 18 BRPM | WEIGHT: 199.52 LBS | HEIGHT: 71 IN | SYSTOLIC BLOOD PRESSURE: 121 MMHG | TEMPERATURE: 97.1 F | BODY MASS INDEX: 27.93 KG/M2 | HEART RATE: 61 BPM | OXYGEN SATURATION: 97 %

## 2023-05-10 DIAGNOSIS — Z86.010 HISTORY OF COLON POLYPS: ICD-10-CM

## 2023-05-10 RX ORDER — SODIUM CHLORIDE 9 MG/ML
125 INJECTION, SOLUTION INTRAVENOUS CONTINUOUS
Status: DISCONTINUED | OUTPATIENT
Start: 2023-05-10 | End: 2023-05-14 | Stop reason: HOSPADM

## 2023-05-10 RX ORDER — LIDOCAINE HYDROCHLORIDE 20 MG/ML
INJECTION, SOLUTION EPIDURAL; INFILTRATION; INTRACAUDAL; PERINEURAL AS NEEDED
Status: DISCONTINUED | OUTPATIENT
Start: 2023-05-10 | End: 2023-05-10

## 2023-05-10 RX ORDER — PROPOFOL 10 MG/ML
INJECTION, EMULSION INTRAVENOUS AS NEEDED
Status: DISCONTINUED | OUTPATIENT
Start: 2023-05-10 | End: 2023-05-10

## 2023-05-10 RX ADMIN — Medication 40 MG: at 09:03

## 2023-05-10 RX ADMIN — PROPOFOL 100 MG: 10 INJECTION, EMULSION INTRAVENOUS at 09:00

## 2023-05-10 RX ADMIN — SODIUM CHLORIDE 125 ML/HR: 0.9 INJECTION, SOLUTION INTRAVENOUS at 08:49

## 2023-05-10 RX ADMIN — LIDOCAINE HYDROCHLORIDE 100 MG: 20 INJECTION, SOLUTION EPIDURAL; INFILTRATION; INTRACAUDAL; PERINEURAL at 09:00

## 2023-05-10 RX ADMIN — PROPOFOL 50 MG: 10 INJECTION, EMULSION INTRAVENOUS at 09:02

## 2023-05-10 RX ADMIN — PROPOFOL 50 MG: 10 INJECTION, EMULSION INTRAVENOUS at 09:08

## 2023-05-10 NOTE — ANESTHESIA POSTPROCEDURE EVALUATION
"Post-Op Assessment Note    CV Status:  Stable    Pain management: adequate     Mental Status:  Alert and awake   Hydration Status:  Euvolemic   PONV Controlled:  Controlled   Airway Patency:  Patent      Post Op Vitals Reviewed: Yes      Staff: Anesthesiologist         No notable events documented      BP      Temp     Pulse     Resp      SpO2      /56   Pulse 61   Temp (!) 97 1 °F (36 2 °C) (Temporal)   Resp 18   Ht 5' 11\" (1 803 m)   Wt 90 5 kg (199 lb 8 3 oz)   SpO2 97%   BMI 27 83 kg/m²     "

## 2023-05-10 NOTE — H&P
History and Physical -  [facility]  Memory Current 56 y o  male MRN: [id number]                   HPI: Memory Current is a 56y o  year old male who presents for colonoscopy for history of colon polyp  REVIEW OF SYSTEMS: Per the HPI, and otherwise unremarkable  Historical Information   Past Medical History:   Diagnosis Date   • BPH (benign prostatic hyperplasia)    • Retinitis pigmentosa      Past Surgical History:   Procedure Laterality Date   • IR BIOPSY BONE MARROW  1/31/2022   • NASAL SEPTUM SURGERY  1985   • NERVE REPAIR Left 2000    L/thumb     Social History   Social History     Substance and Sexual Activity   Alcohol Use Yes    Comment: casual     Social History     Substance and Sexual Activity   Drug Use Never     Social History     Tobacco Use   Smoking Status Never   • Passive exposure: Never   Smokeless Tobacco Never     Family History   Problem Relation Age of Onset   • Hypertension Family    • Other Family    • Heart disease Mother    • Cancer Father        Meds/Allergies       Current Outpatient Medications:   •  cholecalciferol (VITAMIN D3) 1,000 units tablet  •  ibuprofen (MOTRIN) 600 mg tablet  •  levothyroxine 25 mcg tablet  •  Lutein 20 MG CAPS  •  metFORMIN (GLUCOPHAGE) 500 mg tablet  •  NON FORMULARY  •  Omega-3 Fatty Acids (FISH OIL PO)  •  polyethylene glycol (GOLYTELY) 4000 mL solution  •  sildenafil (VIAGRA) 100 mg tablet  •  Vitamins A & D (VITAMIN A & D PO)    No Known Allergies    Objective     There were no vitals taken for this visit  PHYSICAL EXAM    Gen: NAD  Head: NCAT  CV: RRR  CHEST: Clear  ABD: soft, NT/ND  EXT: no edema      ASSESSMENT/PLAN:  This is a 56y o  year old male here for colonoscopy, and he is stable and optimized for his procedure

## 2023-05-10 NOTE — ANESTHESIA PREPROCEDURE EVALUATION
Procedure:  COLONOSCOPY    Relevant Problems   ANESTHESIA (within normal limits)      CARDIO (within normal limits)      ENDO (within normal limits)      GI/HEPATIC (within normal limits)      /RENAL (within normal limits)      GYN (within normal limits)      HEMATOLOGY (within normal limits)      MUSCULOSKELETAL (within normal limits)      NEURO/PSYCH (within normal limits)      PULMONARY   (+) Obstructive sleep apnea        Physical Exam    Airway    Mallampati score: II  TM Distance: >3 FB  Neck ROM: full     Dental   No notable dental hx     Cardiovascular  Cardiovascular exam normal    Pulmonary  Pulmonary exam normal     Other Findings        Anesthesia Plan  ASA Score- 2     Anesthesia Type- IV sedation with anesthesia with ASA Monitors  Additional Monitors:   Airway Plan:           Plan Factors-Exercise tolerance (METS): >4 METS  Chart reviewed  EKG reviewed  Existing labs reviewed  Patient summary reviewed  Patient is not a current smoker  Induction- intravenous  Postoperative Plan-     Informed Consent- Anesthetic plan and risks discussed with patient

## 2023-09-02 DIAGNOSIS — B35.1 ONYCHOMYCOSIS: Primary | ICD-10-CM

## 2023-09-02 RX ORDER — TERBINAFINE HYDROCHLORIDE 250 MG/1
250 TABLET ORAL DAILY
Qty: 90 TABLET | Refills: 0 | Status: SHIPPED | OUTPATIENT
Start: 2023-09-02 | End: 2023-12-01

## 2023-09-06 ENCOUNTER — APPOINTMENT (OUTPATIENT)
Dept: LAB | Facility: CLINIC | Age: 58
End: 2023-09-06
Payer: COMMERCIAL

## 2023-09-06 DIAGNOSIS — B35.1 ONYCHOMYCOSIS: ICD-10-CM

## 2023-09-06 LAB
ALBUMIN SERPL BCP-MCNC: 4.3 G/DL (ref 3.5–5)
ALP SERPL-CCNC: 54 U/L (ref 34–104)
ALT SERPL W P-5'-P-CCNC: 21 U/L (ref 7–52)
ANION GAP SERPL CALCULATED.3IONS-SCNC: 8 MMOL/L
AST SERPL W P-5'-P-CCNC: 18 U/L (ref 13–39)
BILIRUB SERPL-MCNC: 0.51 MG/DL (ref 0.2–1)
BUN SERPL-MCNC: 18 MG/DL (ref 5–25)
CALCIUM SERPL-MCNC: 9.4 MG/DL (ref 8.4–10.2)
CHLORIDE SERPL-SCNC: 102 MMOL/L (ref 96–108)
CO2 SERPL-SCNC: 31 MMOL/L (ref 21–32)
CREAT SERPL-MCNC: 1 MG/DL (ref 0.6–1.3)
GFR SERPL CREATININE-BSD FRML MDRD: 84 ML/MIN/1.73SQ M
GLUCOSE P FAST SERPL-MCNC: 82 MG/DL (ref 65–99)
POTASSIUM SERPL-SCNC: 4.1 MMOL/L (ref 3.5–5.3)
PROT SERPL-MCNC: 6.8 G/DL (ref 6.4–8.4)
SODIUM SERPL-SCNC: 141 MMOL/L (ref 135–147)

## 2023-09-06 PROCEDURE — 80053 COMPREHEN METABOLIC PANEL: CPT

## 2023-09-06 PROCEDURE — 36415 COLL VENOUS BLD VENIPUNCTURE: CPT

## 2023-10-02 ENCOUNTER — OFFICE VISIT (OUTPATIENT)
Dept: URGENT CARE | Facility: CLINIC | Age: 58
End: 2023-10-02
Payer: COMMERCIAL

## 2023-10-02 VITALS
TEMPERATURE: 97.7 F | SYSTOLIC BLOOD PRESSURE: 128 MMHG | DIASTOLIC BLOOD PRESSURE: 72 MMHG | OXYGEN SATURATION: 97 % | RESPIRATION RATE: 20 BRPM | HEART RATE: 70 BPM

## 2023-10-02 DIAGNOSIS — S76.311A HAMSTRING STRAIN, RIGHT, INITIAL ENCOUNTER: Primary | ICD-10-CM

## 2023-10-02 PROCEDURE — 99213 OFFICE O/P EST LOW 20 MIN: CPT | Performed by: NURSE PRACTITIONER

## 2023-10-02 NOTE — PROGRESS NOTES
[facility]        NAME: Jarvis Ellison is a 56 y.o. male  : 1965    MRN: [id number]  DATE: 2023  TIME: 10:30 AM    Assessment and Plan   Hamstring strain, right, initial encounter [S76.311A]  1. Hamstring strain, right, initial encounter  Ambulatory Referral to Physical Therapy        Able to reproduce pain with flexion and stretching. No bruising noted. No swelling noted. Recommend continue compression wrap along with ice applications referral to physical therapy. Start Aleve twice daily. advised to limit activity for the next 2 to 4 weeks to prevent reinjury. Follow-up with PCP. Patient agreement plan. Patient Instructions     Follow up with PCP in 3-5 days. Proceed to  ER if symptoms worsen. Chief Complaint     Chief Complaint   Patient presents with   • Leg Injury     Pt reports feeling a pop like sensation in the right lower leg yesterday while at the beach playing [employer]. History of Present Illness   Jarvis Ellison presents to the clinic c/o    Leg Injury (Pt reports feeling a pop like sensation in the right lower leg yesterday while at the beach playing Frisbee. )  Patient states earlier in the day he was playing beach volleyball with no problems. Then he proceeded to play Frisbee he was running at the time the injury occurred he had up to speed in a last ditch effort to get the Frisbee when he felt the pop in his right upper posterior leg. He has applied ice a few times along with a compression wrap. He did not take any anti-inflammatories. He denies having previous history of similar injury      Review of Systems   Review of Systems   All other systems reviewed and are negative.         Current Medications     Long-Term Medications   Medication Sig Dispense Refill   • cholecalciferol (VITAMIN D3) 1,000 units tablet Take 1,000 Units by mouth daily     • ibuprofen (MOTRIN) 600 mg tablet Take 600 mg by mouth every 6 (six) hours as needed for mild pain • levothyroxine 25 mcg tablet Take 25 mcg by mouth once a week     • metFORMIN (GLUCOPHAGE) 500 mg tablet Take 500 mg by mouth daily with breakfast     • sildenafil (VIAGRA) 100 mg tablet Take 1 tablet (100 mg total) by mouth as needed for erectile dysfunction 6 tablet 3       Current Allergies     Allergies as of 10/02/2023   • (No Known Allergies)            The following portions of the patient's history were reviewed and updated as appropriate: allergies, current medications, past family history, past medical history, past social history, past surgical history and problem list.    Objective   /72 (BP Location: Left arm, Patient Position: Sitting, Cuff Size: Standard)   Pulse 70   Temp 97.7 °F (36.5 °C) (Tympanic)   Resp 20   SpO2 97%        Physical Exam     Physical Exam  Vitals and nursing note reviewed. Constitutional:       Appearance: Normal appearance. He is well-developed. HENT:      Head: Normocephalic and atraumatic. Eyes:      General: Lids are normal.      Conjunctiva/sclera: Conjunctivae normal.      Pupils: Pupils are equal, round, and reactive to light. Cardiovascular:      Rate and Rhythm: Normal rate and regular rhythm. Heart sounds: Normal heart sounds, S1 normal and S2 normal.   Pulmonary:      Effort: Pulmonary effort is normal.      Breath sounds: Normal breath sounds. Musculoskeletal:      Right upper leg: Tenderness present. No swelling, edema, deformity, lacerations or bony tenderness. Legs:    Skin:     General: Skin is warm and dry. Neurological:      Mental Status: He is alert. Psychiatric:         Speech: Speech normal.         Behavior: Behavior normal.         Thought Content:  Thought content normal.         Judgment: Judgment normal.

## 2023-10-02 NOTE — PATIENT INSTRUCTIONS
Hamstring Injury   AMBULATORY CARE:   A hamstring injury  is a bruise, strain, or tear to a hamstring muscle. Your hamstring muscles are in the back of your thigh and help bend and straighten your leg. Common signs and symptoms: You may start to feel symptoms when you rest after activity. You may have any of the following:  Pain, swelling, or bruising    Feeling a pop or tear    Trouble bending or straightening your leg    Loss of strength    Treatment  may include any of the following:  Medicines  can help decrease pain and swelling. Surgery  may be needed if you have a severe strain or tear. You may also need surgery if you have pain or tightening that does not go away. Seek care immediately if:   Your lower leg or foot is pale or blue, and feels cool when you touch it. You have severe pain. You cannot bend or straighten your leg. Call your doctor if:   You have a fever. Your signs and symptoms do not improve with treatment. You have questions or concerns about your condition or care. Self-care:   Rest  your hamstring muscles as directed. You may need to use crutches  until you can put weight on your injured leg without pain. This will help decrease stress and strain on your hamstring muscles. Apply ice  on the back of your thigh for 15 to 20 minutes every hour or as directed. Use an ice pack, or put crushed ice in a plastic bag. Cover it with a towel before you apply it. Ice helps prevent tissue damage and decreases swelling and pain. Wear an elastic bandage  to help decrease swelling. It should be snug but not tight. Elevate  your leg above the level of your heart as often as you can. This will help decrease swelling and pain. Prop your leg on pillows or blankets to keep it elevated comfortably. Go to physical therapy, if directed. A physical therapist teaches you exercises to help improve movement and strength, and to decrease pain.     Prevent another hamstring injury:   Ask when you can return to your usual activities. You may injure your hamstring muscles more if you start activity too soon. Warm up and stretch before and after you exercise. This helps loosen your muscles and decrease stress on your hamstring muscles. Slowly increase time, distance, and how often you train. A sudden increase may cause injury. Follow up with your doctor as directed:  Write down your questions so you remember to ask them during your visits. © Copyright [employer] 2023 Information is for End User's use only and may not be sold, redistributed or otherwise used for commercial purposes. The above information is an  only. It is not intended as medical advice for individual conditions or treatments. Talk to your doctor, nurse or pharmacist before following any medical regimen to see if it is safe and effective for you.

## 2023-10-03 ENCOUNTER — EVALUATION (OUTPATIENT)
Dept: PHYSICAL THERAPY | Facility: CLINIC | Age: 58
End: 2023-10-03
Payer: COMMERCIAL

## 2023-10-03 DIAGNOSIS — S76.311A HAMSTRING STRAIN, RIGHT, INITIAL ENCOUNTER: Primary | ICD-10-CM

## 2023-10-03 PROCEDURE — 97140 MANUAL THERAPY 1/> REGIONS: CPT | Performed by: PHYSICAL THERAPIST

## 2023-10-03 PROCEDURE — 97161 PT EVAL LOW COMPLEX 20 MIN: CPT | Performed by: PHYSICAL THERAPIST

## 2023-10-03 NOTE — PROGRESS NOTES
PT Evaluation     Today's date: 10/3/2023  Patient name: Brent Upton  : 1965  MRN: [id number]  Referring provider: CHAY Griffith  Dx:   Encounter Diagnosis     ICD-10-CM    1. Hamstring strain, right, initial encounter   [facility] Referral to Physical Therapy                     Assessment  Assessment details: Brent Upton is a 56 y.o. male referred to outpatient physical therapy for R hamstring strain. Impairments include pain, decreased LE flexibility, decreased LE strength, antalgic gait and decreased tolerance to activity. These impairments are limiting patients functional ability to perform standing, ambulation, stair navigation, and sleeping. Pt is restricted in participating in [employer]  and recreational activities. Assessment reveals TTP proximal HS muscles, decreased HS length, and strength deficits in knee flexion indicating possible HS strain. Pt would benefit from skilled physical therapy to address the limitations above to allow return to prior level of function. At this point in time, no further referral necessary based upon examination results. Impairments: abnormal gait, abnormal muscle tone, abnormal or restricted ROM, abnormal movement, activity intolerance, impaired balance, impaired physical strength, lacks appropriate home exercise program and pain with function    Symptom irritability: highUnderstanding of Dx/Px/POC: good  Goals  Short term goals 2-3 weeks    1) Patient will demonstrate ability to perform HEP. 2) Patient will improve pain at worst to 4/10 on NPRS. 3) Patient will improve R hamstring length in 90/90 to 30° to demonstrate improved flexibility. Long term goals 4-8 weeks    1) Patient will demonstrate independence with comprehensive HEP. 2) Patient improve FOTO score to equal or greater than expected values. 3) Patient will demonstrate ability to return to recreational activities without increase in symptoms.    4) Patient will demonstrate improved R hamstring length in 90/90 to 45° to demonstrate improved flexibility. Plan  Plan details: Plan of care was discussed with patient at time of evaluation. Pt will be seen 2x a week for 8 weeks. Patient would benefit from: skilled physical therapy  Planned modality interventions: cryotherapy, TENS, thermotherapy: hydrocollator packs and low level laser therapy  Other planned modality interventions: PRN  Planned therapy interventions: balance, flexibility, functional ROM exercises, home exercise program, joint mobilization, manual therapy, neuromuscular re-education, strengthening, therapeutic activities, stretching, therapeutic exercise, gait training, patient education, abdominal trunk stabilization and IASTM  Frequency: 2x week  Duration in weeks: 8  Treatment plan discussed with: patient        Subjective Evaluation    History of Present Illness  Mechanism of injury: Patient presents to outpatient physical therapy with chief complaint of R hamstring strain. Pt reports he was playing [employer] on the beach on Sunday when he ran for it and felt a pop in the back of his upper leg. Was using crutches until last night. Hx of tight HS and achilles. Patient Denies episodes of numbness or tingling in RLE. Pain described as sharp in middle of HS and more towards the inner aspect of the leg. Pain rating currently 0/10, at best 0/10 and at worst 7/10. Patient states limitations with ambulation, standing, sitting, stair navigation (non-reciprocal stair navigation), sleeping. Pt works for [employer] and is not limited with his occupational tasks. Aggravating factors include weight bearing. Patient states use of ibuprofen and ice for relief of symptoms. Pt goals for therapy include getting back to being active. He is in a volleyball league, plays pickle ball and is active with walking and hiking.                Not a recurrent problem   Quality of life: good    Patient Goals  Patient goals for therapy: decreased edema, decreased pain, improved balance, increased motion, return to sport/leisure activities, independence with ADLs/IADLs and increased strength    Pain  Current pain ratin  At best pain ratin  At worst pain ratin  Quality: sharp  Relieving factors: ice and medications  Aggravating factors: stair climbing, sitting, standing and walking  Progression: no change    Social Support    Employment status: working    Diagnostic Tests  No diagnostic tests performed  Treatments  No previous or current treatments        Objective     Palpation     Right   Hypertonic in the proximal biceps femoris, proximal semimembranosus, proximal semitendinosus and TFL. Tenderness of the proximal biceps femoris, proximal semimembranosus and proximal semitendinosus. Tenderness     Right Hip   Tenderness in the ischial tuberosity. Neurological Testing     Sensation     Lumbar   Left   Intact: light touch    Right   Intact: light touch    Active Range of Motion   Left Hip   Flexion: 100 degrees     Right Hip   Flexion: 90 degrees with pain    Strength/Myotome Testing     Left Hip   Planes of Motion   Flexion: 4+  Abduction: 4-    Right Hip   Planes of Motion   Flexion: 4-  Abduction: 4-    Left Knee   Flexion: 5  Extension: 5    Right Knee   Flexion: 3  Extension: 3    Left Ankle/Foot   Dorsiflexion: 5  Plantar flexion: 5    Right Ankle/Foot   Dorsiflexion: 5  Plantar flexion: 5    Additional Strength Details  Did not perform true MMT to R extension and flexion due to irritable symptoms. Able to perform movement throughout the ROM against gravity. Tests     Left Hip   Positive Ely's. 90/90 SLR: Hip flexion: 50. Right Hip   Positive Ely's. Negative SHANICE and FADIR.   90/90 SLR: Positive. Hip flexion: 20.      Ambulation   Weight-Bearing Status   Weight-Bearing Status (Right): full weight-bearing      Ambulation: Level Surfaces   Ambulation without assistive device: independent    Observational Gait   Gait: antalgic   Increased left stance time. Decreased walking speed, stride length, right stance time, left swing time and left step length. Right foot contact pattern: forefoot  Right arm swing: decreased  Base of support: decreased    Quality of Movement During Gait   Trunk    Trunk (Left): Positive left lateral lean over stance limb. Knee    Knee (Right): Positive stiff knee.               Precautions: N/A      Manuals 10/3            R HS STM AG the stick                                                    Neuro Re-Ed                                                                                                        Ther Ex             Bike              Seated HS stretch HEP            Seated HS dig isometric HEP            LAQ HEP            Supine piriformis stretching             Gluteal sets             Quad sets              Bridges             Standing hip abduction/ext             Leg press             Seated HS walking with stool             Ther Activity             Step ups                          Gait Training                                       Modalities

## 2023-10-05 ENCOUNTER — OFFICE VISIT (OUTPATIENT)
Dept: PHYSICAL THERAPY | Facility: CLINIC | Age: 58
End: 2023-10-05
Payer: COMMERCIAL

## 2023-10-05 DIAGNOSIS — S76.311A HAMSTRING STRAIN, RIGHT, INITIAL ENCOUNTER: Primary | ICD-10-CM

## 2023-10-05 PROCEDURE — 97140 MANUAL THERAPY 1/> REGIONS: CPT

## 2023-10-05 PROCEDURE — 97110 THERAPEUTIC EXERCISES: CPT

## 2023-10-05 NOTE — PROGRESS NOTES
Daily Note     Today's date: 10/5/2023  Patient name: Patricia Wheat  : 1965  MRN: [id number]  Referring provider: CHAY Ochoa  Dx:   Encounter Diagnosis     ICD-10-CM    1. Hamstring strain, right, initial encounter  S76.311A                      Subjective: Patient reported that he did not wear ACE wrap last night, feeling more stiff this morning. Has difficulty with step ups. No questions or concerns regarding HEP. Objective: See treatment diary below. Program progressed as reflected below. Updated HEP with supine piriformis stretch and bridges. Assessment: Continued antalgic gait pattern with decreased stance time on R. Moderate tightness through hamstring upon palpation, responding well to manual. Program progressed with focus on proximal strengthening with no report of significant increase in posterior chain symptoms. Reviewed updated HEP with patient and encouraged use of ice, also educating on potential of DOMS. Plan: Continue per plan of care. Progress treatment as tolerated.             Precautions: N/A    Manuals 10/3 10/5           R HS STM AG the stick  The stick -                                                   Neuro Re-Ed                                                                                                        Ther Ex             Bike   5 min  L1           Seated HS stretch HEP 30"x3           Seated HS   dig isometric HEP 2x10           LAQ HEP 2x10           Supine piriformis stretching  30"x3           Gluteal sets             Quad sets   5" hold,  2x10           Bridges  2x10           Standing hip abduction/ext  Abd  OTB  2x10  bilat           Leg press - seat 10  SL  65#  2x10           Seated HS walking with stool                          Ther Activity             Step ups                          Gait Training                                       Modalities

## 2023-10-10 ENCOUNTER — OFFICE VISIT (OUTPATIENT)
Dept: PHYSICAL THERAPY | Facility: CLINIC | Age: 58
End: 2023-10-10
Payer: COMMERCIAL

## 2023-10-10 DIAGNOSIS — S76.311A HAMSTRING STRAIN, RIGHT, INITIAL ENCOUNTER: Primary | ICD-10-CM

## 2023-10-10 PROCEDURE — 97110 THERAPEUTIC EXERCISES: CPT | Performed by: PHYSICAL THERAPIST

## 2023-10-10 PROCEDURE — 97140 MANUAL THERAPY 1/> REGIONS: CPT | Performed by: PHYSICAL THERAPIST

## 2023-10-10 NOTE — PROGRESS NOTES
Daily Note     Today's date: 10/10/2023  Patient name: Zac Iraan  : 1965  MRN: [id number]  Referring provider: Anna Severin, CRNP  Dx:   Encounter Diagnosis     ICD-10-CM    1. Hamstring strain, right, initial encounter  S76.311A                      Subjective: Pt reports he was able to go on a 1.5 mile hike over the weekend with a couple grabs in the leg. States he is now able to do steps better. Feels his muscle is more twitchy than it was and feels more like an ache. Objective: See treatment diary below. Assessment: Demonstrates improved gait mechanics with increased R stance time compared to initial evaluation however limited R knee flexion throughout gait cycle. Significant tightness and soft tissue restrictions in R hamstring and gastroc with increased relief post manuals. Improved tolerance to LAQ in chair vs seated EOB due to pressure on HS. Good tolerance to treatment session with progression of interventions with mild symptoms post session. Educated pt on use of compression wrap PRN along with ice with good understanding. Plan: Continue per plan of care. Progress treatment as tolerated. Precautions: N/A    Manuals 10/3 10/5 1010          R HS STM AG the stick  The stick - EH AG the stick           R gastroc STM   AG the stick                                     Neuro Re-Ed                                                                                                        Ther Ex             Bike   5 min  L1 5' L1          Seated HS stretch HEP 30"x3 3x30"           Seated HS   dig isometric HEP 2x10 HEP          LAQ HEP 2x10 3x10           Supine piriformis stretching  30"x3 3x30"           Quad sets   5" hold,  2x10 HEP          SLR flexion    3x10           Bridges  2x10 3x10           Standing hip abduction/ext  Abd  OTB  2x10  bilat Abduction 3x10 ea.            Leg press - seat 10  SL  65#  2x10 SL 75#  2x10           Seated HS walking with stool Seated HS curls EOB             Ther Activity             Step ups                          Gait Training                                       Modalities

## 2023-10-12 ENCOUNTER — OFFICE VISIT (OUTPATIENT)
Dept: PHYSICAL THERAPY | Facility: CLINIC | Age: 58
End: 2023-10-12
Payer: COMMERCIAL

## 2023-10-12 DIAGNOSIS — S76.311A HAMSTRING STRAIN, RIGHT, INITIAL ENCOUNTER: Primary | ICD-10-CM

## 2023-10-12 PROCEDURE — 97110 THERAPEUTIC EXERCISES: CPT | Performed by: PHYSICAL THERAPIST

## 2023-10-12 PROCEDURE — 97140 MANUAL THERAPY 1/> REGIONS: CPT | Performed by: PHYSICAL THERAPIST

## 2023-10-12 NOTE — PROGRESS NOTES
Daily Note     Today's date: 10/12/2023  Patient name: Mandy Salas  : 1965  MRN: [id number]  Referring provider: CHAY Painting  Dx:   Encounter Diagnosis     ICD-10-CM    1. Hamstring strain, right, initial encounter  S76.311A                      Subjective: Pt reports he is now able to  items from the floor better. States he turned quickly on the steps which bothered his hamstring. Objective: See treatment diary below. Assessment: Increased quadricep and hip flexor tightness noted with supine hamstring curls however no hamstring symptoms present. Performed modified keren stretch due to increased quadricep and hip flexor symptoms requiring therapist assist for increased quadricep stretching. Pt able to tolerate progression of seated HS stretch to supine without increase in pain. Continues to have increased soft tissue restrictions along posterior chain with increased relief post manuals. Overall, pt progressing well with skilled PT. Plan: Continue per plan of care. Progress treatment as tolerated. Precautions: N/A    Manuals 10/3 10/5 10/10 10/12         R HS STM AG the stick  The stick - EH AG the stick  AG the stick          R gastroc STM   AG the stick  AG the stick                                    Neuro Re-Ed                                                                                                        Ther Ex             Bike   5 min  L1 5' L1 5' L1          Seated HS stretch HEP 30"x3 3x30"  Supine 3x30"          Seated HS   dig isometric HEP 2x10 HEP          LAQ HEP 2x10 3x10  3x10          Supine piriformis stretching  30"x3 3x30"           Quad sets   5" hold,  2x10 HEP          SLR flexion    3x10  3x10          Bridges  2x10 3x10  3x10          Standing hip abduction/ext  Abd  OTB  2x10  bilat Abduction 3x10 ea. Abduction OTB   3x10 ea.           Leg press - seat 10  SL  65#  2x10 SL 75#  2x10  SL 75#  3x10          Seated HS walking with stool             Seated HS curls EOB             R modified keren stretch    4x30"  with therapist assist with knee flexion         HS curls with pball    3x10          Ther Activity             Step ups                          Gait Training                                       Modalities

## 2023-10-16 ENCOUNTER — OFFICE VISIT (OUTPATIENT)
Dept: PHYSICAL THERAPY | Facility: CLINIC | Age: 58
End: 2023-10-16
Payer: COMMERCIAL

## 2023-10-16 DIAGNOSIS — S76.311A HAMSTRING STRAIN, RIGHT, INITIAL ENCOUNTER: Primary | ICD-10-CM

## 2023-10-16 PROCEDURE — 97110 THERAPEUTIC EXERCISES: CPT

## 2023-10-16 PROCEDURE — 97140 MANUAL THERAPY 1/> REGIONS: CPT

## 2023-10-16 NOTE — PROGRESS NOTES
Daily Note     Today's date: 10/16/2023  Patient name: Jarvis Ellison  : 1965  MRN: [id number]  Referring provider: CHAY Mcintyre  Dx:   Encounter Diagnosis     ICD-10-CM    1. Hamstring strain, right, initial encounter  S76.311A                      Subjective: Patient reported sitting tolerance is getting better and not worried about upcoming flight but feels layover will help. Overall feels PT is helping and walking is getting better. Objective: See treatment diary below. Assessment: Improved ability to tolerate forward bending when picking up objects from floor. More medial hamstring tightness and tenderness present along with gastroc tightness with more lateral tenderness. Still with significant hip flexor and quad tightness that required manual stretching. Making good progress with proximal strengthening and will continue to progress to work toward goal of return to pre-injury functions and activities without limitations. Plan: Continue per plan of care. Progress treatment as tolerated. Precautions: N/A    Manuals 10/3 10/5 10/10 10/12 10/16        R HS STM AG the stick  The stick - EH AG the stick  AG the stick  The stick - EH        R gastroc STM   AG the stick  AG the stick  The stick - EH                                  Neuro Re-Ed                                                                                                        Ther Ex             Bike   5 min  L1 5' L1 5' L1  5 min  L1        Seated HS stretch HEP 30"x3 3x30"  Supine 3x30"  Supine  30"x3        Seated HS   dig isometric HEP 2x10 HEP          LAQ HEP 2x10 3x10  3x10  1#  3x10        Supine piriformis stretching  30"x3 3x30"           Quad sets   5" hold,  2x10 HEP          SLR flexion    3x10  3x10  3x10        Bridges  2x10 3x10  3x10  3x10        Standing hip abduction/ext  Abd  OTB  2x10  bilat Abduction 3x10 ea. Abduction OTB   3x10 ea.   Abd  OTB  3x10  bilat        Leg press - seat 10  SL  65#  2x10 SL 75#  2x10  SL 75#  3x10  SL  75#  3x10        Seated HS walking with stool             Seated HS curls EOB             R modified keren stretch    4x30"  with therapist assist with knee flexion  With assist into knee flex   30"x4        HS curls with pball    3x10   3x10                     Ther Activity             Step ups                          Gait Training                                       Modalities

## 2023-10-18 ENCOUNTER — OFFICE VISIT (OUTPATIENT)
Dept: PHYSICAL THERAPY | Facility: CLINIC | Age: 58
End: 2023-10-18
Payer: COMMERCIAL

## 2023-10-18 DIAGNOSIS — S76.311A HAMSTRING STRAIN, RIGHT, INITIAL ENCOUNTER: Primary | ICD-10-CM

## 2023-10-18 PROCEDURE — 97112 NEUROMUSCULAR REEDUCATION: CPT | Performed by: PHYSICAL THERAPIST

## 2023-10-18 PROCEDURE — 97140 MANUAL THERAPY 1/> REGIONS: CPT | Performed by: PHYSICAL THERAPIST

## 2023-10-18 PROCEDURE — 97110 THERAPEUTIC EXERCISES: CPT | Performed by: PHYSICAL THERAPIST

## 2023-10-18 NOTE — PROGRESS NOTES
Daily Note     Today's date: 10/18/2023  Patient name: Xander Brantley  : 1965  MRN: [id number]  Referring provider: CHAY Magaña  Dx:   Encounter Diagnosis     ICD-10-CM    1. Hamstring strain, right, initial encounter  S76.311A                        Subjective: Pt reports everyday there is improvement. Feels like he may try some pickle ball with minimal movement. Mild soreness following last session. Objective: See treatment diary below. Assessment: Increased soft tissue restrictions to R hamstring during STM with increased relief post manuals. Good tolerance to progression of interventions with no increase in hamstring symptoms only fatigue. Introduced further proximal chain strengthening to aid in LE stability. Increased difficulty initially with bosu squats with decreased squat depth however increased squat depth with increased repetitions. Overall, pt progressing well with skilled PT and would continue to benefit to maximize function. Plan: Continue per plan of care. Progress treatment as tolerated.             Precautions: N/A    Manuals 10/3 10/5 10/10 10/12 10/16 10/18       R HS STM AG the stick  The stick - EH AG the stick  AG the stick  The stick - EH AG the stick        R gastroc STM   AG the stick  AG the stick  The stick - EH AG the stick                                  Neuro Re-Ed             Bosu squats      3x10                                                                                      Ther Ex             Bike   5 min  L1 5' L1 5' L1  5 min  L1 5' L1       Seated HS stretch HEP 30"x3 3x30"  Supine 3x30"  Supine  30"x3 Supine 3x30"        Seated HS   dig isometric HEP 2x10 HEP          LAQ HEP 2x10 3x10  3x10  1#  3x10 1#  3x10        Supine piriformis stretching  30"x3 3x30"           Quad sets   5" hold,  2x10 HEP          SLR flexion    3x10  3x10  3x10 1#  3x10        Bridges  2x10 3x10  3x10  3x10 BTB 3x10        Standing hip abduction/ext Abd  OTB  2x10  bilat Abduction 3x10 ea. Abduction OTB   3x10 ea. Abd  OTB  3x10  bilat Abd Pink 3x10 ea. Leg press - seat 10  SL  65#  2x10 SL 75#  2x10  SL 75#  3x10  SL  75#  3x10 SL 85#  3x10       Seated HS walking with stool      2 laps 25ft ea.         Seated HS curls EOB             R modified keren stretch    4x30"  with therapist assist with knee flexion  With assist into knee flex   30"x4 4x30"  with therapist assist with knee flexion       HS curls with pball    3x10   3x10 With lift   2x10 double leg        Side stepping       Pink 25ftx4       Ther Activity             Step ups             Lateral step downs      4" 2x10        Gait Training                                       Modalities

## 2023-10-24 ENCOUNTER — OFFICE VISIT (OUTPATIENT)
Dept: PHYSICAL THERAPY | Facility: CLINIC | Age: 58
End: 2023-10-24
Payer: COMMERCIAL

## 2023-10-24 DIAGNOSIS — S76.311A HAMSTRING STRAIN, RIGHT, INITIAL ENCOUNTER: Primary | ICD-10-CM

## 2023-10-24 PROCEDURE — 97112 NEUROMUSCULAR REEDUCATION: CPT | Performed by: PHYSICAL THERAPIST

## 2023-10-24 PROCEDURE — 97110 THERAPEUTIC EXERCISES: CPT | Performed by: PHYSICAL THERAPIST

## 2023-10-24 PROCEDURE — 97140 MANUAL THERAPY 1/> REGIONS: CPT | Performed by: PHYSICAL THERAPIST

## 2023-10-24 NOTE — PROGRESS NOTES
Daily Note     Today's date: 10/24/2023  Patient name: Lucius Whiting  : 1965  MRN: [id number]  Referring provider: CHAY Hardin  Dx:   Encounter Diagnosis     ICD-10-CM    1. Hamstring strain, right, initial encounter  S76.311A                        Subjective: Pt reports he was a little stiff today due to flying yesterday and sitting more. States he notices his achilles has been more sore since last session. Walked miles on [address] with no issue. Objective: See treatment diary below. Assessment: Decreased trigger points in proximal gastroc; educated pt on stretching gastroc and soleus to aid in soft tissue restrictions. Added resistance to seated HS walks with stool with increased difficulty. Increased fatigue with progression of interventions as anticipated. No symptoms present in R HS with decreased tenderness therefore held on STM this session. Overall, pt progressing well further supported by improved FOTO outcomes. Plan: Continue per plan of care. Progress treatment as tolerated.             Precautions: N/A    Manuals 10/3 10/5 10/10 10/12 10/16 10/18 10/24      R HS STM AG the stick  The stick - EH AG the stick  AG the stick  The stick - EH AG the stick        R gastroc STM   AG the stick  AG the stick  The stick - EH AG the stick  AG the stick                                 Neuro Re-Ed             Bosu squats      3x10  3x10                                                                                     Ther Ex             Bike   5 min  L1 5' L1 5' L1  5 min  L1 5' L1 5' L1       Seated HS stretch HEP 30"x3 3x30"  Supine 3x30"  Supine  30"x3 Supine 3x30"  Supine 3x30"       Seated HS   dig isometric HEP 2x10 HEP          LAQ HEP 2x10 3x10  3x10  1#  3x10 1#  3x10  1.5#  3x10       Supine piriformis stretching  30"x3 3x30"           Quad sets   5" hold,  2x10 HEP          SLR flexion    3x10  3x10  3x10 1#  3x10  1.5#  3x10       Bridges  2x10 3x10  3x10  3x10 BTB 3x10  BTB 3x10       Standing hip abduction/ext  Abd  OTB  2x10  bilat Abduction 3x10 ea. Abduction OTB   3x10 ea. Abd  OTB  3x10  bilat Abd Pink 3x10 ea. Leg press - seat 10  SL  65#  2x10 SL 75#  2x10  SL 75#  3x10  SL  75#  3x10 SL 85#  3x10 SL 95#  3x10       Seated HS walking with stool      2 laps 25ft ea. 2 laps 25ft ea.  With resistance using stretch out strap      Seated HS curls EOB             R modified keren stretch    4x30"  with therapist assist with knee flexion  With assist into knee flex   30"x4 4x30"  with therapist assist with knee flexion 4x30"  with therapist assist with knee flexion      HS curls with pball    3x10   3x10 With lift   2x10 double leg  With lift   3x10 double leg       Side stepping       Pink 25ftx4 Pink 25ftx4      Ther Activity             Step ups             Lateral step downs      4" 2x10  4" 3x10       Gait Training                                       Modalities

## 2023-10-26 ENCOUNTER — OFFICE VISIT (OUTPATIENT)
Dept: PHYSICAL THERAPY | Facility: CLINIC | Age: 58
End: 2023-10-26
Payer: COMMERCIAL

## 2023-10-26 DIAGNOSIS — S76.311A HAMSTRING STRAIN, RIGHT, INITIAL ENCOUNTER: Primary | ICD-10-CM

## 2023-10-26 PROCEDURE — 97110 THERAPEUTIC EXERCISES: CPT | Performed by: PHYSICAL THERAPIST

## 2023-10-26 PROCEDURE — 97112 NEUROMUSCULAR REEDUCATION: CPT | Performed by: PHYSICAL THERAPIST

## 2023-10-26 PROCEDURE — 97140 MANUAL THERAPY 1/> REGIONS: CPT | Performed by: PHYSICAL THERAPIST

## 2023-10-26 NOTE — PROGRESS NOTES
Daily Note     Today's date: 10/26/2023  Patient name: Megha Garcia  : 1965  MRN: [id number]  Referring provider: CHAY Lyn  Dx:   Encounter Diagnosis     ICD-10-CM    1. Hamstring strain, right, initial encounter  S76.311A                        Subjective: Pt reports he was a little stiff today due to flying yesterday and sitting more. States he notices his achilles has been more sore since last session. Walked miles on [place] with no issue. Objective: See treatment diary below. Assessment: Continued decrease in trigger points in R gastroc with decreased tenderness. Introduced further hamstring strengthening with walk out bridges with good tolerance. Progressed interventions this session with good tolerance and increased fatigue as anticipated. No symptom provocation throughout session. Cues provided throughout session to improve overall form. Plan: Continue per plan of care. Progress treatment as tolerated.             Precautions: N/A    Manuals 10/3 10/5 10/10 10/12 10/16 10/18 10/24 10/26     R HS STM AG the stick  The stick - EH AG the stick  AG the stick  The stick - EH AG the stick        R gastroc STM   AG the stick  AG the stick  The stick - EH AG the stick  AG the stick  AG the stick                                Neuro Re-Ed             Bosu squats      3x10  3x10  3x10                                                                                    Ther Ex             Bike   5 min  L1 5' L1 5' L1  5 min  L1 5' L1 5' L1  Ellipitcal 5' L1     Seated HS stretch HEP 30"x3 3x30"  Supine 3x30"  Supine  30"x3 Supine 3x30"  Supine 3x30"  Supine 3x30"      Seated HS   dig isometric HEP 2x10 HEP          LAQ HEP 2x10 3x10  3x10  1#  3x10 1#  3x10  1.5#  3x10  2.5#  3x10      Supine piriformis stretching  30"x3 3x30"           Quad sets   5" hold,  2x10 HEP          SLR flexion    3x10  3x10  3x10 1#  3x10  1.5#  3x10  1.5#  3x10      Bridges  2x10 3x10  3x10  3x10 BTB 3x10  BTB 3x10  BTB 3x10  Bosu     Standing hip abduction/ext  Abd  OTB  2x10  bilat Abduction 3x10 ea. Abduction OTB   3x10 ea. Abd  OTB  3x10  bilat Abd Pink 3x10 ea. Leg press - seat 10  SL  65#  2x10 SL 75#  2x10  SL 75#  3x10  SL  75#  3x10 SL 85#  3x10 SL 95#  3x10  #  3x10      Seated HS walking with stool      2 laps 25ft ea. 2 laps 25ft ea. With resistance using stretch out strap 2 laps 30ft ea.  With resistance using stretch out strap     Seated HS curls EOB             R modified keren stretch    4x30"  with therapist assist with knee flexion  With assist into knee flex   30"x4 4x30"  with therapist assist with knee flexion 4x30"  with therapist assist with knee flexion 4x30" with green strap      HS curls with pball    3x10   3x10 With lift   2x10 double leg  With lift   3x10 double leg  With lift   3x10 double leg      Side stepping       Pink 25ftx4 Pink 25ftx4 GTB 25ftx4     Bridge walk outs         1x10     Ther Activity             Step ups             Lateral step downs      4" 2x10  4" 3x10  6" 3x10      Gait Training                                       Modalities

## 2023-10-31 ENCOUNTER — OFFICE VISIT (OUTPATIENT)
Dept: PHYSICAL THERAPY | Facility: CLINIC | Age: 58
End: 2023-10-31
Payer: COMMERCIAL

## 2023-10-31 DIAGNOSIS — S76.311A HAMSTRING STRAIN, RIGHT, INITIAL ENCOUNTER: Primary | ICD-10-CM

## 2023-10-31 PROCEDURE — 97112 NEUROMUSCULAR REEDUCATION: CPT | Performed by: PHYSICAL THERAPIST

## 2023-10-31 PROCEDURE — 97110 THERAPEUTIC EXERCISES: CPT | Performed by: PHYSICAL THERAPIST

## 2023-10-31 PROCEDURE — 97140 MANUAL THERAPY 1/> REGIONS: CPT | Performed by: PHYSICAL THERAPIST

## 2023-10-31 NOTE — PROGRESS NOTES
Daily Note     Today's date: 10/31/2023  Patient name: Linn Casarez  : 1965  MRN: [id number]  Referring provider: CHAY Menendez  Dx:   Encounter Diagnosis     ICD-10-CM    1. Hamstring strain, right, initial encounter  S76.311A                        Subjective: Pt reports he [place] beach [place] and every now and then he would feel tweaks in the leg. Objective: See treatment diary below. Assessment: Continued tenderness to lateral gastroc during STM with increased relief post manuals. Mild symptom provocation with progression of double limb bridge to single leg bridge due to increased stress on hamstring musculature. Increased fatigue with progression of interventions as anticipated. Overall, continued progression with decreased symptoms and improved tolerance to activity. Plan: Continue per plan of care. Progress treatment as tolerated.             Precautions: N/A    Manuals 10/3 10/5 10/10 10/12 10/16 10/18 10/24 10/26 10/31    R HS STM AG the stick  The stick - EH AG the stick  AG the stick  The stick - EH AG the stick        R gastroc STM   AG the stick  AG the stick  The stick - EH AG the stick  AG the stick  AG the stick  AG the stick                               Neuro Re-Ed             Bosu squats      3x10  3x10  3x10  3x10  GMB                                                                                 Ther Ex             Bike   5 min  L1 5' L1 5' L1  5 min  L1 5' L1 5' L1  Ellipitcal 5' L1 Ellipitcal 5' L1    Seated HS stretch HEP 30"x3 3x30"  Supine 3x30"  Supine  30"x3 Supine 3x30"  Supine 3x30"  Supine 3x30"  Supine 3x30"     Seated HS   dig isometric HEP 2x10 HEP          LAQ HEP 2x10 3x10  3x10  1#  3x10 1#  3x10  1.5#  3x10  2.5#  3x10  3#  3x10     Supine piriformis stretching  30"x3 3x30"           Quad sets   5" hold,  2x10 HEP          SLR flexion    3x10  3x10  3x10 1#  3x10  1.5#  3x10  1.5#  3x10  2#  3x10 Bridges  2x10 3x10  3x10  3x10 BTB 3x10  BTB 3x10  BTB 3x10  Single leg     3x10     Standing hip abduction/ext  Abd  OTB  2x10  bilat Abduction 3x10 ea. Abduction OTB   3x10 ea. Abd  OTB  3x10  bilat Abd Pink 3x10 ea. Leg press - seat 10  SL  65#  2x10 SL 75#  2x10  SL 75#  3x10  SL  75#  3x10 SL 85#  3x10 SL 95#  3x10  #  3x10  #  3x10     Seated HS walking with stool      2 laps 25ft ea. 2 laps 25ft ea. With resistance using stretch out strap 2 laps 30ft ea. With resistance using stretch out strap 2 laps 30ft ea.  With resistance using stretch out strap    Seated HS curls EOB             R modified keren stretch    4x30"  with therapist assist with knee flexion  With assist into knee flex   30"x4 4x30"  with therapist assist with knee flexion 4x30"  with therapist assist with knee flexion 4x30" with green strap  4x30" with green strap     HS curls with pball    3x10   3x10 With lift   2x10 double leg  With lift   3x10 double leg  With lift   3x10 double leg  With lift   3x10 double leg     Side stepping       Pink 25ftx4 Pink 25ftx4 GTB 25ftx4 BTB 25ftx6    Bridge walk outs         1x10 2x10     Ther Activity             Step ups             Lateral step downs      4" 2x10  4" 3x10  6" 3x10  6" 3x10     Gait Training                                       Modalities

## 2023-11-02 ENCOUNTER — OFFICE VISIT (OUTPATIENT)
Dept: PHYSICAL THERAPY | Facility: CLINIC | Age: 58
End: 2023-11-02
Payer: COMMERCIAL

## 2023-11-02 DIAGNOSIS — S76.311A HAMSTRING STRAIN, RIGHT, INITIAL ENCOUNTER: Primary | ICD-10-CM

## 2023-11-02 PROCEDURE — 97110 THERAPEUTIC EXERCISES: CPT | Performed by: PHYSICAL THERAPIST

## 2023-11-02 PROCEDURE — 97530 THERAPEUTIC ACTIVITIES: CPT | Performed by: PHYSICAL THERAPIST

## 2023-11-02 PROCEDURE — 97140 MANUAL THERAPY 1/> REGIONS: CPT | Performed by: PHYSICAL THERAPIST

## 2023-11-02 NOTE — PROGRESS NOTES
Daily Note     Today's date: 2023  Patient name: Xander Brantley  : 1965  MRN: [id number]  Referring provider: CHAY Magaña  Dx:   Encounter Diagnosis     ICD-10-CM    1. Hamstring strain, right, initial encounter  S76.311A                        Subjective: Pt reports same soreness and symptoms. States he might try pickle ball this weekend. Objective: See treatment diary below. Assessment: Introduced jogging with lateral shuffling movements to initiate push off with return to sports. No symptom provocation with planting and push off phases. Most challenge with single leg bridges with increased fatigue in R hamstring. Educated pt on easing back into sports and to limit diving during pickle ball with good understanding. Plan: Continue per plan of care. Progress treatment as tolerated.             Precautions: N/A    Manuals 10/3 10/5 10/10 10/12 10/16 10/18 10/24 10/26 10/31 11/2   R HS STM AG the stick  The stick - EH AG the stick  AG the stick  The stick - EH AG the stick        R gastroc STM   AG the stick  AG the stick  The stick - EH AG the stick  AG the stick  AG the stick  AG the stick  AG the stick                              Neuro Re-Ed             Bosu squats      3x10  3x10  3x10  3x10  BMB 3x10                                                                                  Ther Ex             Bike   5 min  L1 5' L1 5' L1  5 min  L1 5' L1 5' L1  Ellipitcal 5' L1 Ellipitcal 5' L1 Ellipitcal 5' L1   Seated HS stretch HEP 30"x3 3x30"  Supine 3x30"  Supine  30"x3 Supine 3x30"  Supine 3x30"  Supine 3x30"  Supine 3x30"  Supine 3x30"    Seated HS   dig isometric HEP 2x10 HEP          LAQ HEP 2x10 3x10  3x10  1#  3x10 1#  3x10  1.5#  3x10  2.5#  3x10  3#  3x10  HEP   Supine piriformis stretching  30"x3 3x30"           Quad sets   5" hold,  2x10 HEP          SLR flexion    3x10  3x10  3x10 1#  3x10  1.5#  3x10  1.5#  3x10  2#  3x10  2.5#  3x10                Bridges  2x10 3x10  3x10  3x10 BTB 3x10  BTB 3x10  BTB 3x10  Single leg     3x10  Single leg     3x10    Standing hip abduction/ext  Abd  OTB  2x10  bilat Abduction 3x10 ea. Abduction OTB   3x10 ea. Abd  OTB  3x10  bilat Abd Pink 3x10 ea. Leg press - seat 10  SL  65#  2x10 SL 75#  2x10  SL 75#  3x10  SL  75#  3x10 SL 85#  3x10 SL 95#  3x10  #  3x10  #  3x10  #  3x10    Seated HS walking with stool      2 laps 25ft ea. 2 laps 25ft ea. With resistance using stretch out strap 2 laps 30ft ea. With resistance using stretch out strap 2 laps 30ft ea. With resistance using stretch out strap 2 laps 30ft ea.  With resistance using stretch out strap   Seated HS curls EOB             R modified keren stretch    4x30"  with therapist assist with knee flexion  With assist into knee flex   30"x4 4x30"  with therapist assist with knee flexion 4x30"  with therapist assist with knee flexion 4x30" with green strap  4x30" with green strap  2x30" with green strap    HS curls with pball    3x10   3x10 With lift   2x10 double leg  With lift   3x10 double leg  With lift   3x10 double leg  With lift   3x10 double leg  With lift   3x12  double leg    Side stepping       Pink 25ftx4 Pink 25ftx4 GTB 25ftx4 BTB 25ftx6 BTB 25ftx6   Bridge walk outs         1x10 2x10  3x10    Ther Activity             Step ups             Lateral step downs      4" 2x10  4" 3x10  6" 3x10  6" 3x10  6" 3x10    Fwd, lateral shuffle, backwards, lateral shuffle          5 laps    Gait Training                                       Modalities

## 2023-11-07 ENCOUNTER — OFFICE VISIT (OUTPATIENT)
Dept: PHYSICAL THERAPY | Facility: CLINIC | Age: 58
End: 2023-11-07
Payer: COMMERCIAL

## 2023-11-07 DIAGNOSIS — S76.311A HAMSTRING STRAIN, RIGHT, INITIAL ENCOUNTER: Primary | ICD-10-CM

## 2023-11-07 PROCEDURE — 97110 THERAPEUTIC EXERCISES: CPT | Performed by: PHYSICAL THERAPIST

## 2023-11-07 PROCEDURE — 97530 THERAPEUTIC ACTIVITIES: CPT | Performed by: PHYSICAL THERAPIST

## 2023-11-07 PROCEDURE — 97140 MANUAL THERAPY 1/> REGIONS: CPT | Performed by: PHYSICAL THERAPIST

## 2023-11-07 NOTE — PROGRESS NOTES
Daily Note     Today's date: 2023  Patient name: Roney Ortiz  : 1965  MRN: [id number]  Referring provider: CHAY Baldwin  Dx:   Encounter Diagnosis     ICD-10-CM    1. Hamstring strain, right, initial encounter  S76.311A                        Subjective: Pt reports  he is feeling good. Minimal pain overall. Did not play pickle ball over the weekend. Objective: See treatment diary below. Assessment: Focused on R planting and push off during lateral shuffles and forward jogging with good tolerance and no symptom provocation. Increased tightness and tenderness during manual therapy with increased relief of soft tissue restrictions post STM. Able to tolerate progression of intervention intensity with increased fatigue as anticipated. Plan: Progress note during next visit. Potential discharge next visit. Precautions: N/A    Manuals 11/7      10/24 10/26 10/31 11/2   R HS STM             R gastroc STM AG      AG the stick  AG the stick  AG the stick  AG the stick                              Neuro Re-Ed             Bosu squats       3x10  3x10  3x10  BMB 3x10                                                                                  Ther Ex             Bike  Ellipitcal 5' L1      5' L1  Ellipitcal 5' L1 Ellipitcal 5' L1 Ellipitcal 5' L1   Seated HS stretch Supine 3x30"       Supine 3x30"  Supine 3x30"  Supine 3x30"  Supine 3x30"    Seated HS   dig isometric             LAQ       1.5#  3x10  2.5#  3x10  3#  3x10  HEP   Supine piriformis stretching             Quad sets              SLR flexion  3#  3x10       1.5#  3x10  1.5#  3x10  2#  3x10  2.5#  3x10   Bridges Single leg   3x10       BTB 3x10  BTB 3x10  Single leg     3x10  Single leg     3x10    Standing hip abduction/ext             Leg press - seat 10 #  3x10      SL 95#  3x10  #  3x10  #  3x10  #  3x10    Seated HS walking with stool 2 laps 30ft ea.  With resistance using stretch out strap      2 laps 25ft ea. With resistance using stretch out strap 2 laps 30ft ea. With resistance using stretch out strap 2 laps 30ft ea. With resistance using stretch out strap 2 laps 30ft ea.  With resistance using stretch out strap   Seated HS curls EOB             R modified keren stretch       4x30"  with therapist assist with knee flexion 4x30" with green strap  4x30" with green strap  2x30" with green strap    HS curls with pball With lift   3x12  double leg       With lift   3x10 double leg  With lift   3x10 double leg  With lift   3x10 double leg  With lift   3x12  double leg    Side stepping  BTB 35ftx6      Pink 25ftx4 GTB 25ftx4 BTB 25ftx6 BTB 25ftx6   Bridge walk outs         1x10 2x10  3x10    Ther Activity             Step ups             Lateral step downs 6"   3x10       4" 3x10  6" 3x10  6" 3x10  6" 3x10    Fwd, lateral shuffle, backwards, lateral shuffle 5 laps          5 laps    Gait Training                                       Modalities

## 2023-11-09 ENCOUNTER — OFFICE VISIT (OUTPATIENT)
Dept: PHYSICAL THERAPY | Facility: CLINIC | Age: 58
End: 2023-11-09
Payer: COMMERCIAL

## 2023-11-09 DIAGNOSIS — S76.311A HAMSTRING STRAIN, RIGHT, INITIAL ENCOUNTER: Primary | ICD-10-CM

## 2023-11-09 PROCEDURE — 97110 THERAPEUTIC EXERCISES: CPT | Performed by: PHYSICAL THERAPIST

## 2023-11-09 PROCEDURE — 97140 MANUAL THERAPY 1/> REGIONS: CPT | Performed by: PHYSICAL THERAPIST

## 2023-11-09 PROCEDURE — 97530 THERAPEUTIC ACTIVITIES: CPT | Performed by: PHYSICAL THERAPIST

## 2023-11-09 NOTE — PROGRESS NOTES
PT Re-Evaluation  and PT Discharge    Today's date: 2023  Patient name: Ashley Gary  : 1965  MRN: [id number]  Referring provider: CHAY Fink  Dx:   Encounter Diagnosis     ICD-10-CM    1. Hamstring strain, right, initial encounter  S76.311A                      Assessment  Assessment details: Ashley Gary is a 56 y.o. male referred to outpatient physical therapy for R hamstring strain. Pt reports 95% improvement which correlates to improved FOTO outcomes. Ashley Gary has been compliant with attending PT and home exercise program since initial eval. Berkley Pineda has made improvements in objective data and achieved desired functional goals. Patient reports having returned to their prior level or function. It was mutually agreed to Discharge to home exercise program at this time. Patient has good understanding of provided home exercise program and was instructed to call with any questions or if issues should arise. Goals  Short term goals 2-3 weeks    1) Patient will demonstrate ability to perform HEP. (MET)   2) Patient will improve pain at worst to 4/10 on NPRS. (MET)  3) Patient will improve R hamstring length in 90/90 to 30° to demonstrate improved flexibility. (MET)      Long term goals 4-8 weeks    1) Patient will demonstrate independence with comprehensive HEP. (PROGRESSING)  2) Patient improve FOTO score to equal or greater than expected values. (MET)  3) Patient will demonstrate ability to return to recreational activities without increase in symptoms. (PROGRESSING)  4) Patient will demonstrate improved R hamstring length in 90/90 to 45° to demonstrate improved flexibility. (MET)        Plan  Plan details: Discharged. Subjective Evaluation    History of Present Illness  Mechanism of injury: Patient presents to outpatient physical therapy with chief complaint of R hamstring strain.  Pt reports he was [place] [employer] [place]  when he ran for it and felt a pop in the back of his upper leg. Was using crutches until last night. Hx of tight HS and achilles. Patient Denies episodes of numbness or tingling in RLE. Pain described as sharp in middle of HS and more towards the inner aspect of the leg. Pain rating currently 0/10, at best 0/10 and at worst 7/10. Patient states limitations with ambulation, standing, sitting, stair navigation (non-reciprocal stair navigation), sleeping. Pt works for [employer] and is not limited with his occupational tasks. Aggravating factors include weight bearing. Patient states use of ibuprofen and ice for relief of symptoms. Pt goals for therapy include getting back to being active. He is in a volleyball league, plays pickle ball and is active with walking and hiking. 23: Pt reports he is careful with his movements but overall is feeling good. States no limitations at home and will plan to return to recreational activities. Not a recurrent problem   Quality of life: good    Patient Goals  Patient goals for therapy: decreased edema, decreased pain, improved balance, increased motion, return to sport/leisure activities, independence with ADLs/IADLs and increased strength    Pain  Current pain ratin  At best pain ratin  At worst pain ratin  Quality: sharp  Relieving factors: ice and medications  Aggravating factors: stair climbing, sitting, standing and walking  Progression: no change    Social Support    Employment status: working    Diagnostic Tests  No diagnostic tests performed  Treatments  No previous or current treatments    Objective     Palpation     Right   Hypertonic in the proximal biceps femoris, proximal semimembranosus, proximal semitendinosus and TFL. Tenderness     Right Hip   No tenderness in the ischial tuberosity.      Neurological Testing     Sensation     Lumbar   Left   Intact: light touch    Right   Intact: light touch    Active Range of Motion   Left Hip   Flexion: 100 degrees     Right Hip   Flexion: 100 degrees     Strength/Myotome Testing     Left Hip   Planes of Motion   Flexion: 5  Abduction: 4-    Right Hip   Planes of Motion   Flexion: 5  Abduction: 4-    Left Knee   Flexion: 5  Extension: 5    Right Knee   Flexion: 5  Extension: 5    Left Ankle/Foot   Dorsiflexion: 5  Plantar flexion: 5    Right Ankle/Foot   Dorsiflexion: 5  Plantar flexion: 5    Tests     Left Hip   Positive Ely's. 90/90 SLR: Hip flexion: 50. Right Hip   Positive Ely's. Negative SHANICE and FADIR.   90/90 SLR: Positive. Hip flexion: 55. Ambulation   Weight-Bearing Status   Weight-Bearing Status (Right): full weight-bearing      Ambulation: Level Surfaces   Ambulation without assistive device: independent    Observational Gait   Walking speed, stride length, right stance time, right swing time and right step length within functional limits. Right foot contact pattern: heel to toe  Right arm swing: within functional limits  Base of support: normal    Quality of Movement During Gait   Trunk    Trunk (Left): Negative lateral lean at stance. Knee    Knee (Right): Negative stiff knee.             Precautions: N/A    Manuals 11/7 11/9     10/24 10/26 10/31 11/2   R HS STM             R gastroc STM AG AG the stick      AG the stick  AG the stick  AG the stick  AG the stick                 Re-eval  AG           Neuro Re-Ed             Bosu squats       3x10  3x10  3x10  BMB 3x10                                                                                  Ther Ex             Bike  Ellipitcal 5' L1 Ellipitcal 5' L1     5' L1  Ellipitcal 5' L1 Ellipitcal 5' L1 Ellipitcal 5' L1   Seated HS stretch Supine 3x30"  Supine 3x30"      Supine 3x30"  Supine 3x30"  Supine 3x30"  Supine 3x30"    Seated HS   dig isometric             LAQ       1.5#  3x10  2.5#  3x10  3#  3x10  HEP   Supine piriformis stretching             Quad sets              SLR flexion  3#  3x10  3#  3x10      1.5#  3x10  1.5#  3x10 2#  3x10  2.5#  3x10   Bridges Single leg   3x10  Single leg   3x10      BTB 3x10  BTB 3x10  Single leg     3x10  Single leg     3x10    Standing hip abduction/ext             Leg press - seat 10 #  3x10 #  3x10     SL 95#  3x10  #  3x10  #  3x10  #  3x10    Seated HS walking with stool 2 laps 30ft ea. With resistance using stretch out strap 2 laps 30ft ea. With resistance using stretch out strap     2 laps 25ft ea. With resistance using stretch out strap 2 laps 30ft ea. With resistance using stretch out strap 2 laps 30ft ea. With resistance using stretch out strap 2 laps 30ft ea.  With resistance using stretch out strap   Seated HS curls EOB             R modified keren stretch       4x30"  with therapist assist with knee flexion 4x30" with green strap  4x30" with green strap  2x30" with green strap    HS curls with pball With lift   3x12  double leg  With lift   3x12  double leg      With lift   3x10 double leg  With lift   3x10 double leg  With lift   3x10 double leg  With lift   3x12  double leg    Side stepping  BTB 35ftx6 BTB 35ftx6     Pink 25ftx4 GTB 25ftx4 BTB 25ftx6 BTB 25ftx6   Bridge walk outs         1x10 2x10  3x10    Ther Activity             Step ups             Lateral step downs 6"   3x10  6"   3x10      4" 3x10  6" 3x10  6" 3x10  6" 3x10    Fwd, lateral shuffle, backwards, lateral shuffle 5 laps  5 laps         5 laps    Gait Training                                       Modalities

## 2023-12-29 ENCOUNTER — OFFICE VISIT (OUTPATIENT)
Dept: URGENT CARE | Facility: CLINIC | Age: 58
End: 2023-12-29
Payer: COMMERCIAL

## 2023-12-29 VITALS
DIASTOLIC BLOOD PRESSURE: 67 MMHG | TEMPERATURE: 97.1 F | RESPIRATION RATE: 18 BRPM | SYSTOLIC BLOOD PRESSURE: 173 MMHG | HEART RATE: 85 BPM | OXYGEN SATURATION: 98 %

## 2023-12-29 DIAGNOSIS — M62.830 MUSCLE SPASM OF BACK: ICD-10-CM

## 2023-12-29 DIAGNOSIS — M54.6 ACUTE RIGHT-SIDED THORACIC BACK PAIN: Primary | ICD-10-CM

## 2023-12-29 PROCEDURE — 99213 OFFICE O/P EST LOW 20 MIN: CPT

## 2023-12-29 RX ORDER — NAPROXEN 500 MG/1
500 TABLET ORAL 2 TIMES DAILY WITH MEALS
Qty: 30 TABLET | Refills: 0 | Status: SHIPPED | OUTPATIENT
Start: 2023-12-29

## 2023-12-29 RX ORDER — CYCLOBENZAPRINE HCL 5 MG
5 TABLET ORAL 3 TIMES DAILY PRN
Qty: 21 TABLET | Refills: 0 | Status: SHIPPED | OUTPATIENT
Start: 2023-12-29

## 2023-12-29 NOTE — PROGRESS NOTES
[facility]        NAME: Jose R Solano is a 58 y.o. male  : 1965    MRN: [id number]  DATE: 2023  TIME: 2:34 PM    Assessment and Plan   Acute right-sided thoracic back pain [M54.6]  1. Acute right-sided thoracic back pain  naproxen (Naprosyn) 500 mg tablet      2. Muscle spasm of back  cyclobenzaprine (FLEXERIL) 5 mg tablet            Patient Instructions     Start naproxen as prescribed  Start flexeril as prescribed  Ice 20 minutes 3-4 times per day  Insulate the skin from the ice to prevent frostbite  Follow up with orthopedic if symptoms do not improve  Follow up with PCP in 3-5 days.  Proceed to ER if symptoms worsen.    Chief Complaint     Chief Complaint   Patient presents with    Back Pain     Patient with back pain and spasms for a few days         History of Present Illness       Patient is a 59 yo male with no significant PMH presenting in the clinic today for back pain x 2 days. Denies recent injuries, trauma, and/or falls. Patient locates their pain to the right sided thoracic back. Admits pain is exacerbated with standing, walking, and forward flexion. Denies fever, chills, numbness, tingling, swelling, bruising, erythema, warmth, chest pain, SOB, and changes to bowel/bladder habits. Admits the use of ibuprofen for symptom management. Denies prior similar injuries. Admits recent URI sx.        Review of Systems   Review of Systems   Constitutional:  Negative for chills and fever.   Respiratory:  Negative for shortness of breath.    Cardiovascular:  Negative for chest pain.   Musculoskeletal:  Positive for back pain. Negative for joint swelling.   Skin:  Negative for rash and wound.   Neurological:  Negative for numbness.         Current Medications       Current Outpatient Medications:     cyclobenzaprine (FLEXERIL) 5 mg tablet, Take 1 tablet (5 mg total) by mouth 3 (three) times a day as needed for muscle spasms, Disp: 21 tablet, Rfl: 0    naproxen (Naprosyn) 500 mg  tablet, Take 1 tablet (500 mg total) by mouth 2 (two) times a day with meals, Disp: 30 tablet, Rfl: 0    cholecalciferol (VITAMIN D3) 1,000 units tablet, Take 1,000 Units by mouth daily, Disp: , Rfl:     ibuprofen (MOTRIN) 600 mg tablet, Take 600 mg by mouth every 6 (six) hours as needed for mild pain, Disp: , Rfl:     levothyroxine 25 mcg tablet, Take 25 mcg by mouth once a week, Disp: , Rfl:     Lutein 20 MG CAPS, Take 20 mg by mouth in the morning  , Disp: , Rfl:     metFORMIN (GLUCOPHAGE) 500 mg tablet, Take 500 mg by mouth daily with breakfast, Disp: , Rfl:     NON FORMULARY, Biote DIM SGS+, Disp: , Rfl:     NON FORMULARY, , Disp: , Rfl:     Omega-3 Fatty Acids (FISH OIL PO), Take 1 tablet by mouth in the morning  , Disp: , Rfl:     sildenafil (VIAGRA) 100 mg tablet, Take 1 tablet (100 mg total) by mouth as needed for erectile dysfunction, Disp: 6 tablet, Rfl: 3    Vitamins A & D (VITAMIN A & D PO), Take by mouth Vitamin ADK, Disp: , Rfl:     Current Allergies     Allergies as of 12/29/2023    (No Known Allergies)            The following portions of the patient's history were reviewed and updated as appropriate: allergies, current medications, past family history, past medical history, past social history, past surgical history and problem list.     Past Medical History:   Diagnosis Date    BPH (benign prostatic hyperplasia)     Disease of thyroid gland     Retinitis pigmentosa        Past Surgical History:   Procedure Laterality Date    IR BIOPSY BONE MARROW  1/31/2022    NASAL SEPTUM SURGERY  1985    NERVE REPAIR Left 2000    L/thumb       Family History   Problem Relation Age of Onset    Hypertension Family     Other Family     Heart disease Mother     Cancer Father          Medications have been verified.        Objective   BP (!) 173/67   Pulse 85   Temp (!) 97.1 °F (36.2 °C) (Tympanic)   Resp 18   SpO2 98%        Physical Exam     Physical Exam  Vitals reviewed.   Constitutional:       General: He is  not in acute distress.     Appearance: Normal appearance. He is normal weight. He is not ill-appearing.   HENT:      Head: Normocephalic.      Nose: Nose normal. No congestion or rhinorrhea.      Mouth/Throat:      Mouth: Mucous membranes are moist.   Eyes:      General:         Right eye: No discharge.         Left eye: No discharge.      Conjunctiva/sclera: Conjunctivae normal.   Cardiovascular:      Rate and Rhythm: Normal rate and regular rhythm.      Pulses: Normal pulses.      Heart sounds: Normal heart sounds.      No friction rub. No gallop.   Pulmonary:      Effort: Pulmonary effort is normal.      Breath sounds: Normal breath sounds. No wheezing, rhonchi or rales.   Musculoskeletal:      Cervical back: Normal, normal range of motion and neck supple.      Thoracic back: Spasms (palpable along right paraspinous muscles) and tenderness (b/l paraspinous) present. No swelling or bony tenderness. Decreased range of motion.      Lumbar back: Normal.   Skin:     General: Skin is warm.      Findings: No rash.   Neurological:      Mental Status: He is alert.   Psychiatric:         Mood and Affect: Mood normal.         Behavior: Behavior normal.

## 2023-12-29 NOTE — PATIENT INSTRUCTIONS
Start naproxen as prescribed  Start flexeril as prescribed  Ice 20 minutes 3-4 times per day  Insulate the skin from the ice to prevent frostbite  Follow up with orthopedic if symptoms do not improve  Follow up with PCP in 3-5 days.  Proceed to ER if symptoms worsen.

## 2024-02-05 DIAGNOSIS — G47.33 OBSTRUCTIVE SLEEP APNEA: Primary | ICD-10-CM

## 2024-02-07 ENCOUNTER — OFFICE VISIT (OUTPATIENT)
Dept: SLEEP CENTER | Facility: CLINIC | Age: 59
End: 2024-02-07
Payer: COMMERCIAL

## 2024-02-07 VITALS
DIASTOLIC BLOOD PRESSURE: 79 MMHG | HEART RATE: 66 BPM | HEIGHT: 71 IN | BODY MASS INDEX: 28.7 KG/M2 | SYSTOLIC BLOOD PRESSURE: 135 MMHG | WEIGHT: 205 LBS

## 2024-02-07 DIAGNOSIS — R79.81 ELEVATED CO2 LEVEL: ICD-10-CM

## 2024-02-07 DIAGNOSIS — R53.83 OTHER FATIGUE: ICD-10-CM

## 2024-02-07 DIAGNOSIS — J30.2 SEASONAL ALLERGIC RHINITIS, UNSPECIFIED TRIGGER: ICD-10-CM

## 2024-02-07 DIAGNOSIS — G47.33 OBSTRUCTIVE SLEEP APNEA: Primary | ICD-10-CM

## 2024-02-07 PROCEDURE — 99243 OFF/OP CNSLTJ NEW/EST LOW 30: CPT | Performed by: INTERNAL MEDICINE

## 2024-02-07 RX ORDER — METFORMIN HYDROCHLORIDE 500 MG/1
TABLET, EXTENDED RELEASE ORAL
COMMUNITY
Start: 2024-02-05

## 2024-02-07 NOTE — LETTER
February 7, 2024     Mark Lutz DO  [address]  [address]  [facility] [address]    Patient: Jose R Solano   YOB: 1965   Date of Visit: 2/7/2024       Dear Dr. Lutz:    Thank you for referring Jose R Solano to me for evaluation. Below are my notes for this consultation.    If you have questions, please do not hesitate to call me. I look forward to following your patient along with you.         Sincerely,        Janes Buchanan DO        CC: No Recipients    Casandra Raymundo MD  2/7/2024  8:58 AM  Attested  Sleep Consultation   Jose R Solano 58 y.o. male MRN: [id number]      Reason for consultation: Obstructive sleep apnea     Requesting physician: Dr. Mark Lutz (PCP)    Assessment/Plan  Patient is a 59 yo M with PMH including BPH, retinitis pigmentosa, fatigue who presents for evaluation of obstructive sleep apnea. Patient underwent home sleep study 2 years ago as part of workup for fatigue. Moderate MARIAM was demonstrated on HST with ORACIO 17.9, however he chose not to pursue treatment with CPAP at that time. He has never been treated for MARIAM previously. He now presents as his wife reports snoring is becoming worse, and he has had frequent episodes of nocturnal choking and gasping. Additionally, his bicarbonate levels on chemistry panel have been elevated in the past.       I explained to the patient in details the pathophysiology of obstructive sleep apnea.  I also explained the importance of treatment, and the consequences of untreated obstructive sleep apnea on underlying cardiovascular and cerebrovascular risk, morbidity, and mortality.      Patient is agreeable to treatment with PAP therapy. Will initiate auto-CPAP at 5-15cm H20 via nasal interface. Given his remote history of nasal trauma and chronic seasonal rhinitis, he may require full face mask if unable to tolerate nasal interface. ENT referral can also be considered in the future for further  evaluation.    Will preemptively order home sleep study in the event his insurance requires a more recent sleep study to cover CPAP device, as his original study was completed 2 years ago.      If intolerance with PAP therapy will be encountered in the future, will discuss other treatment options such as mandibular advancement device, the importance of weight loss, positional therapy if applicable, or surgery if indicated.     Patient to follow up in office in approximately 2 months for compliance evaluation. Patient instructed to call office or send [employer] message with any questions or concerns in the interim.      1. Obstructive sleep apnea  - Ambulatory Referral to Sleep Medicine  - Home Study; Future  - CPAP Auto New DME    2. Other fatigue  - Home Study; Future  - CPAP Auto New DME    3. Elevated CO2 level     4. Seasonal allergic rhinitis, unspecified trigger       History of Present Illness  HPI:  Jose R Solano is a 58 y.o. male with PMHx including BPH, retinitis pigmentosa, fatigue.. Patient presents for evaluation of obstructive sleep apnea.       Patient underwent evaluation for MARIAM in 4/2022 as part of evaluation for fatigue, snoring, witnessed apneic episodes. Results showed ORACIO 17.9 and it was recommended that he use CPAP at that time, however he wanted to lose weight to see if that helped with his MARIAM. He was not interested in CPAP use after original sleep study, and was never on PAP therapy in the past.     He now presents because his wife is bothered by loud snoring and snoring seems to be louder now. He reports he snores much less when he weighs less than 185lb. Has been trying to lose weight but has been unsuccessful. He is unsure if he is currently having apneic episodes. He wakes up with dry mouth frequently.  He admits to waking up frequently gasping and choking, this is occurring more often in the last few weeks. Denies morning headaches. Admits to non-refreshing sleep. Admits to mild  fatigue ongoing for at least 2 years, this is intermittent and does not occur everyday. Denies any changes with mood, concentration, or memory.     He reports chronic postnasal drip and chronic seasonal rhinitis for which he has intermittently tried Flonase. He currently takes Zyrtec daily. He reports trauma to his nose in college and underwent septoplasty 1986. Ever since, he has had trouble breathing through his nose.     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 He wakes up 1x for nocturia. Sometimes his wife nudges him to wake up when he is snoring overnight. He is able to fall back asleep easily. He wakes up at 7:00AM. Same sleep schedule on weekends. Does not take naps.  Feels that he would take naps if he had the time.     Sleep medications:  denies  Caffeine use: 1-2 monster energy drink per day  Alcohol use: 2 drinks on weekends   Cigarettes: Lifelong nonsmoker     Other sleep related behaviors and symptoms:   Restless leg symptoms: denies  Kicking legs during sleep: denies  Parasomnias: denies  Nightmares:  denies  Acid reflux during sleep: +yes very rare (related to diet)  Teeth grinding:  denies but admits to clenching at times   Sleep paralysis, cataplexy, sleep attacks or hypnagogic or hypnopompic hallucinations: denies  REM Behavioral Sleep Disorder:  denies    [place] Score: 4/24  Sitting and reading: Slight chance of dozing  Watching TV: Would never doze  Sitting, inactive in a public place (e.g. a theatre or a meeting): Would never doze  As a passenger in a car for an hour without a break: Would never doze  Lying down to rest in the afternoon when circumstances permit: High chance of dozing  Sitting and talking to someone: Would never doze  Sitting quietly after a lunch without alcohol: Would never doze  In a car, while stopped for  a few minutes in traffic: Would never doze  Total score: 4    History of tonsillectomy: denies    +FH MARIAM in his father (he does not use CPAP due to dementia)     Historical Information  Past Medical History:   Diagnosis Date   • BPH (benign prostatic hyperplasia)    • Disease of thyroid gland    • Retinitis pigmentosa      Past Surgical History:   Procedure Laterality Date   • IR BIOPSY BONE MARROW  1/31/2022   • NASAL SEPTUM SURGERY  1985   • NERVE REPAIR Left 2000    L/thumb     Family History   Problem Relation Age of Onset   • Hypertension Family    • Other Family    • Heart disease Mother    • Cancer Father      Social History     Socioeconomic History   • Marital status: /Civil Union     Spouse name: Not on file   • Number of children: Not on file   • Years of education: Not on file   • Highest education level: Not on file   Occupational History   • Not on file   Tobacco Use   • Smoking status: Never     Passive exposure: Never   • Smokeless tobacco: Never   Vaping Use   • Vaping status: Never Used   Substance and Sexual Activity   • Alcohol use: Yes     Comment: casual   • Drug use: Never   • Sexual activity: Yes     Partners: Female   Other Topics Concern   • Not on file   Social History Narrative   • Not on file     Social Determinants of Health     Financial Resource Strain: Low Risk  (2/10/2023)    Overall Financial Resource Strain (CARDIA)    • Difficulty of Paying Living Expenses: Not hard at all   Food Insecurity: No Food Insecurity (2/10/2023)    Hunger Vital Sign    • Worried About Running Out of Food in the Last Year: Never true    • Ran Out of Food in the Last Year: Never true   Transportation Needs: No Transportation Needs (2/10/2023)    PRAPARE - Transportation    • Lack of Transportation (Medical): No    • Lack of Transportation (Non-Medical): No   Physical Activity: Sufficiently Active (2/10/2023)    Exercise Vital Sign    • Days of Exercise per Week: 6 days    • Minutes of Exercise per  Session: 40 min   Stress: No Stress Concern Present (2/10/2023)    [employer] [place] [employer] - Occupational Stress Questionnaire    • Feeling of Stress : Not at all   Social Connections: Moderately Isolated (2/10/2023)    Social Connection and Isolation Panel [NHANES]    • Frequency of Communication with Friends and Family: More than three times a week    • Frequency of Social Gatherings with Friends and Family: More than three times a week    • Attends Islam Services: Never    • Active Member of Clubs or Organizations: No    • Attends Club or Organization Meetings: Never    • Marital Status:    Intimate Partner Violence: Not At Risk (2/10/2023)    Humiliation, Afraid, Rape, and Kick questionnaire    • Fear of Current or Ex-Partner: No    • Emotionally Abused: No    • Physically Abused: No    • Sexually Abused: No   Housing Stability: Unknown (2/10/2023)    Housing Stability Vital Sign    • Unable to Pay for Housing in the Last Year: No    • Number of Places Lived in the Last Year: Not on file    • Unstable Housing in the Last Year: No       Meds/Allergies  No Known Allergies    Home medications:  Prior to Admission medications    Medication Sig Start Date End Date Taking? Authorizing Provider   cholecalciferol (VITAMIN D3) 1,000 units tablet Take 1,000 Units by mouth daily   Yes Historical Provider, MD   ibuprofen (MOTRIN) 600 mg tablet Take 600 mg by mouth every 6 (six) hours as needed for mild pain   Yes Historical Provider, MD   levothyroxine 25 mcg tablet Take 25 mcg by mouth once a week   Yes Historical Provider, MD   Lutein 20 MG CAPS Take 20 mg by mouth in the morning   9/29/17  Yes Historical Provider, MD   metFORMIN (GLUCOPHAGE) 500 mg tablet Take 500 mg by mouth daily with breakfast   Yes Historical Provider, MD   NON FORMULARY Biote DIM SGS+   Yes Historical Provider, MD   NON FORMULARY    Yes Historical Provider, MD   Omega-3 Fatty Acids (FISH OIL PO) Take 1 tablet by mouth in  "the morning     Yes Historical Provider, MD   sildenafil (VIAGRA) 100 mg tablet Take 1 tablet (100 mg total) by mouth as needed for erectile dysfunction 8/22/22  Yes Mark Lutz, DO   Vitamins A & D (VITAMIN A & D PO) Take by mouth Vitamin ADK   Yes Historical Provider, MD   cyclobenzaprine (FLEXERIL) 5 mg tablet Take 1 tablet (5 mg total) by mouth 3 (three) times a day as needed for muscle spasms  Patient not taking: Reported on 2/7/2024 12/29/23   Valencia Garcia PA-C   metFORMIN (GLUCOPHAGE-XR) 500 mg 24 hr tablet  2/5/24   Historical Provider, MD   naproxen (Naprosyn) 500 mg tablet Take 1 tablet (500 mg total) by mouth 2 (two) times a day with meals  Patient not taking: Reported on 2/7/2024 12/29/23   Valencia Garcia PA-C     Vitals:   Height 5' 11\" (1.803 m), weight 93 kg (205 lb)., Body mass index is 28.59 kg/m².  Neck Circumference: 17    Wt Readings from Last 6 Encounters:   02/07/24 93 kg (205 lb)   05/10/23 90.5 kg (199 lb 8.3 oz)   02/10/23 90.5 kg (199 lb 9.6 oz)   05/24/22 92.1 kg (203 lb)   02/14/22 94.8 kg (209 lb)   02/08/22 94.5 kg (208 lb 6.4 oz)      Physical Exam:  General: Sitting in chair, awake alert and oriented to person, place, and time. No acute distress  HEENT: PERRL, Nares patent, no craniofacial abnormalities. Mucous membranes, moist, no oral lesions, and normal dentition. Mallampati class 1, tonsils 1+, +mild erythema   NECK: Neck circumference 17 inches, Trachea midline, no accessory muscle use, and no stridor   CARDIAC: Regular rate and rhythm, no murmur   PULM: CTA bilaterally no wheezing, rhonchi or rales. No conversational dyspnea  EXT: No cyanosis, no clubbing, and no peripheral edema    NEURO: No focal neurologic deficits, moving all extremities appropriately    Labs: I have personally reviewed pertinent lab results.  Lab Results   Component Value Date    WBC 3.53 (L) 12/13/2021    HGB 15.2 12/13/2021    HCT 48.3 12/13/2021    MCV 92 12/13/2021     12/13/2021      Lab " "Results   Component Value Date    GLUCOSE 88 09/17/2015    CALCIUM 9.8 11/09/2023     09/17/2015    K 4.1 11/09/2023    CO2 29 11/09/2023     11/09/2023    BUN 18 11/09/2023    CREATININE 1.01 11/09/2023     No results found for: \"IRON\", \"TIBC\", \"FERRITIN\"  Lab Results   Component Value Date    MZXMOVCO08 539 08/20/2021     No results found for: \"FOLATE\"    Arterial Blood Gas result:  N/A     Sleep studies:    HST 4/7/2022:  IMPRESSION:  Mr. Solano demonstrated an increased number of sleep related respiratory events (ORACIO - 17.9) which is consistent with moderate obstructive sleep apnea. Therefore, I recommend a trial of auto-titrating CPAP 5-20 cc of H2O pressure with heated humidification.  Compliance should be evaluated in 1-2 months.            Casandra Raymundo   [facility]'s Sleep Fellow    Attestation signed by Janes Buchanan DO at 2/7/2024  9:08 AM:  Attending Attestation   I have personally seen and examined.  I discussed the patient with the  fellow including, but not limited to, verifying findings; reviewing labs and x-rays;developing the plan of care with patient.  I have reviewed the note and assessment performed by the fellow and agree with the fellow's documented findings and plan of care with the following additions. Please see my following comments for details and adjustments.     Assessment/Plan  58 y.o. M with PMHx of retinitis pigmentosa who comes in for management of MARIAM.  1. Moderate MARIAM (ORACIO - 17.9) - note currently on therapy but with persistent snoring, daytime sleepiness.      -  Start trial of 5-20 today, follow compliance data in 1- 2 months      -  Will order a home study as a back up plan if the study is deemed too old      -  Discussed in depth the results of the sleep study and treatment pitfalls prior to initiation.  Answered all questions regarding treatment      -  I also discussed in depth the risk of leaving sleep apnea untreated including hypertension, heart " failure, arrhythmia, MI and stroke.    2.  Elevated CO2 on BMP - follow this on BMP after treatment.  This is likely due to untreated MARIAM    3.  Seasonal allergic rhinitis - consider use of OTC Flonase and Azelastin.    HPI:  Jose R Solano is a 58 y.o. male with PMHx as below who comes in for management of MARIAM.   He was diagnosed on a home study several years ago but did not want to start CPAP.  He attempted to lose weight but had little success.  His wife has noted worsening snoring and daytime sleepiness such that she recommended he get treated so he came in for evaluation.  Patient notes difficulty staying asleep, snoring, excessive daytime sleepiness despite an [place] score of 4, awakenings with gasping, witnessed apneas, morning headaches, awakenings with dry mouth.  he denies symptoms of restless legs.  he denies symptoms of cataplexy, sleep paralysis, hypnopompic or hypnagogic hallucinations.           Sleep schedule: Patient goes to bed at 11:00PM, sleep latency is a few minutes. He wakes up 1x for nocturia. Sometimes his wife nudges him to wake up when he is snoring overnight. He is able to fall back asleep easily. He wakes up at 7:00AM. Same sleep schedule on weekends. Does not take naps.  Feels that he would take naps if he had the time.        Sleep medications:  denies  Caffeine use: 1-2 monster energy drink per day  Alcohol use: 2 drinks on weekends   Cigarettes: Lifelong nonsmoker           Vitals:    02/07/24 0809   BP: 135/79   Pulse: 66     General:  Patient is awake, alert, non-toxic and in no acute respiratory distress  Eyes: PERRL, no scleral icterus  Neck: No JVD  CV:  Regular, +S1 and S2, No murmurs, gallops or rubs appreciated  Lungs: Clear to auscultation bilateral without wheeze, rales or rhonci  Abdomen: Soft, +BS, Non-tender, non-distended  Extremities: No clubbing, cyanosis or edema  Neuro: No focal motor/sensory deficits  Skin: Warm, No rashes or ulcerations  Lab Results   Component  Value Date    WBC 3.53 (L) 12/13/2021    HGB 15.2 12/13/2021    HCT 48.3 12/13/2021    MCV 92 12/13/2021     12/13/2021     Lab Results   Component Value Date    SODIUM 141 11/09/2023    K 4.1 11/09/2023     11/09/2023    CO2 29 11/09/2023    BUN 18 11/09/2023    CREATININE 1.01 11/09/2023    GLUC 88 11/09/2023    CALCIUM 9.8 11/09/2023     Lab Results   Component Value Date    ALT 21 09/06/2023    AST 18 09/06/2023    ALKPHOS 54 09/06/2023    BILITOT 0.74 09/17/2015     Home study- ORACIO 17.9      Janes Buchanan DO

## 2024-02-07 NOTE — PATIENT INSTRUCTIONS

## 2024-02-07 NOTE — PROGRESS NOTES
Sleep Consultation   Jose R Solano 58 y.o. male MRN: [id number]      Reason for consultation: Obstructive sleep apnea     Requesting physician: Dr. Mark Lutz (PCP)    Assessment/Plan  Patient is a 57 yo M with PMH including BPH, retinitis pigmentosa, fatigue who presents for evaluation of obstructive sleep apnea. Patient underwent home sleep study 2 years ago as part of workup for fatigue. Moderate MARIAM was demonstrated on HST with ORACIO 17.9, however he chose not to pursue treatment with CPAP at that time. He has never been treated for MARIAM previously. He now presents as his wife reports snoring is becoming worse, and he has had frequent episodes of nocturnal choking and gasping. Additionally, his bicarbonate levels on chemistry panel have been elevated in the past.       I explained to the patient in details the pathophysiology of obstructive sleep apnea.  I also explained the importance of treatment, and the consequences of untreated obstructive sleep apnea on underlying cardiovascular and cerebrovascular risk, morbidity, and mortality.      Patient is agreeable to treatment with PAP therapy. Will initiate auto-CPAP at 5-15cm H20 via nasal interface. Given his remote history of nasal trauma and chronic seasonal rhinitis, he may require full face mask if unable to tolerate nasal interface. ENT referral can also be considered in the future for further evaluation.    Will preemptively order home sleep study in the event his insurance requires a more recent sleep study to cover CPAP device, as his original study was completed 2 years ago.      If intolerance with PAP therapy will be encountered in the future, will discuss other treatment options such as mandibular advancement device, the importance of weight loss, positional therapy if applicable, or surgery if indicated.     Patient to follow up in office in approximately 2 months for compliance evaluation. Patient instructed to call office or [facility]  [facility] with any questions or concerns in the interim.      1. Obstructive sleep apnea  - Ambulatory Referral to Sleep Medicine  - Home Study; Future  - CPAP Auto New DME    2. Other fatigue  - Home Study; Future  - CPAP Auto New DME    3. Elevated CO2 level     4. Seasonal allergic rhinitis, unspecified trigger       History of Present Illness   HPI:  Jose R Solano is a 58 y.o. male with PMHx including BPH, retinitis pigmentosa, fatigue.. Patient presents for evaluation of obstructive sleep apnea.       Patient underwent evaluation for MARIAM in 4/2022 as part of evaluation for fatigue, snoring, witnessed apneic episodes. Results showed ORACIO 17.9 and it was recommended that he use CPAP at that time, however he wanted to lose weight to see if that helped with his MARIAM. He was not interested in CPAP use after original sleep study, and was never on PAP therapy in the past.     He now presents because his wife is bothered by loud snoring and snoring seems to be louder now. He reports he snores much less when he weighs less than 185lb. Has been trying to lose weight but has been unsuccessful. He is unsure if he is currently having apneic episodes. He wakes up with dry mouth frequently.  He admits to waking up frequently gasping and choking, this is occurring more often in the last few weeks. Denies morning headaches. Admits to non-refreshing sleep. Admits to mild fatigue ongoing for at least 2 years, this is intermittent and does not occur everyday. Denies any changes with mood, concentration, or memory.     He reports chronic postnasal drip and chronic seasonal rhinitis for which he has intermittently tried Flonase. He currently takes Zyrtec daily. He reports trauma to his nose in college and underwent septoplasty 1986. Ever since, he has had trouble breathing through his nose.     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 He wakes up 1x for nocturia. Sometimes his wife nudges him to wake up when he is snoring overnight. He is able to fall back asleep easily. He wakes up at 7:00AM. Same sleep schedule on weekends. Does not take naps.  Feels that he would take naps if he had the time.     Sleep medications:  denies  Caffeine use: 1-2 monster energy drink per day  Alcohol use: 2 drinks on weekends   Cigarettes: Lifelong nonsmoker     Other sleep related behaviors and symptoms:   Restless leg symptoms: denies  Kicking legs during sleep: denies  Parasomnias: denies  Nightmares:  denies  Acid reflux during sleep: +yes very rare (related to diet)  Teeth grinding:  denies but admits to clenching at times   Sleep paralysis, cataplexy, sleep attacks or hypnagogic or hypnopompic hallucinations: denies  REM Behavioral Sleep Disorder:  denies    [place] Score: 4/24  Sitting and reading: Slight chance of dozing  Watching TV: Would never doze  Sitting, inactive in a public place (e.g. a theatre or a meeting): Would never doze  As a passenger in a car for an hour without a break: Would never doze  Lying down to rest in the afternoon when circumstances permit: High chance of dozing  Sitting and talking to someone: Would never doze  Sitting quietly after a lunch without alcohol: Would never doze  In a car, while stopped for a few minutes in traffic: Would never doze  Total score: 4    History of tonsillectomy: denies    +FH MARIAM in his father (he does not use CPAP due to dementia)     Historical Information   Past Medical History:   Diagnosis Date    BPH (benign prostatic hyperplasia)     Disease of thyroid gland     Retinitis pigmentosa      Past Surgical History:   Procedure Laterality Date    IR BIOPSY BONE MARROW  1/31/2022    NASAL SEPTUM SURGERY  1985    NERVE REPAIR Left 2000    L/thumb     Family History   Problem Relation Age of Onset    Hypertension Family     Other  Family     Heart disease Mother     Cancer Father      Social History     Socioeconomic History    Marital status: /Civil Union     Spouse name: Not on file    Number of children: Not on file    Years of education: Not on file    Highest education level: Not on file   Occupational History    Not on file   Tobacco Use    Smoking status: Never     Passive exposure: Never    Smokeless tobacco: Never   Vaping Use    Vaping status: Never Used   Substance and Sexual Activity    Alcohol use: Yes     Comment: casual    Drug use: Never    Sexual activity: Yes     Partners: Female   Other Topics Concern    Not on file   Social History Narrative    Not on file     Social Determinants of Health     Financial Resource Strain: Low Risk  (2/10/2023)    Overall Financial Resource Strain (CARDIA)     Difficulty of Paying Living Expenses: Not hard at all   Food Insecurity: No Food Insecurity (2/10/2023)    Hunger Vital Sign     Worried About Running Out of Food in the Last Year: Never true     Ran Out of Food in the Last Year: Never true   Transportation Needs: No Transportation Needs (2/10/2023)    PRAPARE - Transportation     Lack of Transportation (Medical): No     Lack of Transportation (Non-Medical): No   Physical Activity: Sufficiently Active (2/10/2023)    Exercise Vital Sign     Days of Exercise per Week: 6 days     Minutes of Exercise per Session: 40 min   Stress: No Stress Concern Present (2/10/2023)    [employer] - Occupational Stress Questionnaire     Feeling of Stress : Not at all   Social Connections: Moderately Isolated (2/10/2023)    Social Connection and Isolation Panel [NHANES]     Frequency of Communication with Friends and Family: More than three times a week     Frequency of Social Gatherings with Friends and Family: More than three times a week     Attends Jew Services: Never     Active Member of Clubs or Organizations: No     Attends Club or Organization Meetings:  Never     Marital Status:    Intimate Partner Violence: Not At Risk (2/10/2023)    Humiliation, Afraid, Rape, and Kick questionnaire     Fear of Current or Ex-Partner: No     Emotionally Abused: No     Physically Abused: No     Sexually Abused: No   Housing Stability: Unknown (2/10/2023)    Housing Stability Vital Sign     Unable to Pay for Housing in the Last Year: No     Number of Places Lived in the Last Year: Not on file     Unstable Housing in the Last Year: No       Meds/Allergies   No Known Allergies    Home medications:  Prior to Admission medications    Medication Sig Start Date End Date Taking? Authorizing Provider   cholecalciferol (VITAMIN D3) 1,000 units tablet Take 1,000 Units by mouth daily   Yes Historical Provider, MD   ibuprofen (MOTRIN) 600 mg tablet Take 600 mg by mouth every 6 (six) hours as needed for mild pain   Yes Historical Provider, MD   levothyroxine 25 mcg tablet Take 25 mcg by mouth once a week   Yes Historical Provider, MD   Lutein 20 MG CAPS Take 20 mg by mouth in the morning   9/29/17  Yes Historical Provider, MD   metFORMIN (GLUCOPHAGE) 500 mg tablet Take 500 mg by mouth daily with breakfast   Yes Historical Provider, MD   NON FORMULARY Biote DIM SGS+   Yes Historical Provider, MD   NON FORMULARY    Yes Historical Provider, MD   Omega-3 Fatty Acids (FISH OIL PO) Take 1 tablet by mouth in the morning     Yes Historical Provider, MD   sildenafil (VIAGRA) 100 mg tablet Take 1 tablet (100 mg total) by mouth as needed for erectile dysfunction 8/22/22  Yes Mark Lutz,    Vitamins A & D (VITAMIN A & D PO) Take by mouth Vitamin ADK   Yes Historical Provider, MD   cyclobenzaprine (FLEXERIL) 5 mg tablet Take 1 tablet (5 mg total) by mouth 3 (three) times a day as needed for muscle spasms  Patient not taking: Reported on 2/7/2024 12/29/23   Valencia Garcia PA-C   metFORMIN (GLUCOPHAGE-XR) 500 mg 24 hr tablet  2/5/24   Historical Provider, MD   naproxen (Naprosyn) 500 mg tablet Take  "1 tablet (500 mg total) by mouth 2 (two) times a day with meals  Patient not taking: Reported on 2/7/2024 12/29/23   Valencia Garcia PA-C     Vitals:   Height 5' 11\" (1.803 m), weight 93 kg (205 lb)., Body mass index is 28.59 kg/m².  Neck Circumference: 17    Wt Readings from Last 6 Encounters:   02/07/24 93 kg (205 lb)   05/10/23 90.5 kg (199 lb 8.3 oz)   02/10/23 90.5 kg (199 lb 9.6 oz)   05/24/22 92.1 kg (203 lb)   02/14/22 94.8 kg (209 lb)   02/08/22 94.5 kg (208 lb 6.4 oz)      Physical Exam:  General: Sitting in chair, awake alert and oriented to person, place, and time. No acute distress  HEENT: PERRL, Nares patent, no craniofacial abnormalities. Mucous membranes, moist, no oral lesions, and normal dentition. Mallampati class 1, tonsils 1+, +mild erythema   NECK: Neck circumference 17 inches, Trachea midline, no accessory muscle use, and no stridor   CARDIAC: Regular rate and rhythm, no murmur   PULM: CTA bilaterally no wheezing, rhonchi or rales. No conversational dyspnea  EXT: No cyanosis, no clubbing, and no peripheral edema    NEURO: No focal neurologic deficits, moving all extremities appropriately    Labs: I have personally reviewed pertinent lab results.  Lab Results   Component Value Date    WBC 3.53 (L) 12/13/2021    HGB 15.2 12/13/2021    HCT 48.3 12/13/2021    MCV 92 12/13/2021     12/13/2021      Lab Results   Component Value Date    GLUCOSE 88 09/17/2015    CALCIUM 9.8 11/09/2023     09/17/2015    K 4.1 11/09/2023    CO2 29 11/09/2023     11/09/2023    BUN 18 11/09/2023    CREATININE 1.01 11/09/2023     No results found for: \"IRON\", \"TIBC\", \"FERRITIN\"  Lab Results   Component Value Date    OWINYONA43 539 08/20/2021     No results found for: \"FOLATE\"    Arterial Blood Gas result:  N/A     Sleep studies:    HST 4/7/2022:  IMPRESSION:  Mr. Solano demonstrated an increased number of sleep related respiratory events (ORACIO - 17.9) which is consistent with moderate obstructive sleep " apnea. Therefore, I recommend a trial of auto-titrating CPAP 5-20 cc of H2O pressure with heated humidification.  Compliance should be evaluated in 1-2 months.            Casandra Raymundo   [facility]'s Sleep Fellow

## 2024-02-27 ENCOUNTER — TELEPHONE (OUTPATIENT)
Dept: SLEEP CENTER | Facility: CLINIC | Age: 59
End: 2024-02-27

## 2024-02-27 NOTE — TELEPHONE ENCOUNTER
Left call back message in response to the patient's Mychart message.Will send him a Urbfult message also

## 2024-02-27 NOTE — TELEPHONE ENCOUNTER
----- Message from Jose R Solano sent at 2/23/2024  8:43 PM EST -----  Regarding: C-pap machine  Contact: [phone]  I was seen two weeks ago and you weren’t sure if I would be able to get a C-Pap off my sleep study from April 2022.  You thought it would go through but we scheduled a sleep study for mid May in case [facility] wouldn’t cover it.  I called and left voice mails twice this week to see what the status was and haven’t received a return call.      Any idea whether or not I will need to do the sleep study in mid march?

## 2024-03-01 LAB

## 2024-03-05 LAB
DME PARACHUTE DELIVERY DATE ACTUAL: NORMAL
DME PARACHUTE DELIVERY DATE EXPECTED: NORMAL
DME PARACHUTE DELIVERY DATE REQUESTED: NORMAL
DME PARACHUTE ITEM DESCRIPTION: NORMAL
DME PARACHUTE ORDER STATUS: NORMAL
DME PARACHUTE SUPPLIER NAME: NORMAL
DME PARACHUTE SUPPLIER PHONE: NORMAL

## 2024-03-22 ENCOUNTER — TELEPHONE (OUTPATIENT)
Dept: SLEEP CENTER | Facility: CLINIC | Age: 59
End: 2024-03-22

## 2024-08-05 ENCOUNTER — OFFICE VISIT (OUTPATIENT)
Dept: FAMILY MEDICINE CLINIC | Facility: CLINIC | Age: 59
End: 2024-08-05
Payer: COMMERCIAL

## 2024-08-05 VITALS
WEIGHT: 210 LBS | HEART RATE: 57 BPM | SYSTOLIC BLOOD PRESSURE: 130 MMHG | HEIGHT: 71 IN | OXYGEN SATURATION: 97 % | BODY MASS INDEX: 29.4 KG/M2 | DIASTOLIC BLOOD PRESSURE: 84 MMHG | TEMPERATURE: 97.2 F

## 2024-08-05 DIAGNOSIS — Z23 ENCOUNTER FOR IMMUNIZATION: ICD-10-CM

## 2024-08-05 DIAGNOSIS — Z13.1 SCREENING FOR DIABETES MELLITUS: ICD-10-CM

## 2024-08-05 DIAGNOSIS — N40.1 BENIGN PROSTATIC HYPERPLASIA WITH WEAK URINARY STREAM: ICD-10-CM

## 2024-08-05 DIAGNOSIS — Z13.6 SCREENING FOR CARDIOVASCULAR CONDITION: ICD-10-CM

## 2024-08-05 DIAGNOSIS — R39.12 BENIGN PROSTATIC HYPERPLASIA WITH WEAK URINARY STREAM: ICD-10-CM

## 2024-08-05 DIAGNOSIS — Z13.29 SCREENING FOR THYROID DISORDER: ICD-10-CM

## 2024-08-05 DIAGNOSIS — Z12.5 SCREENING FOR PROSTATE CANCER: Primary | ICD-10-CM

## 2024-08-05 DIAGNOSIS — Z13.0 SCREENING FOR DEFICIENCY ANEMIA: ICD-10-CM

## 2024-08-05 DIAGNOSIS — Z00.00 ANNUAL PHYSICAL EXAM: ICD-10-CM

## 2024-08-05 PROCEDURE — 99396 PREV VISIT EST AGE 40-64: CPT | Performed by: FAMILY MEDICINE

## 2024-08-05 PROCEDURE — 90471 IMMUNIZATION ADMIN: CPT

## 2024-08-05 PROCEDURE — 90750 HZV VACC RECOMBINANT IM: CPT

## 2024-08-05 RX ORDER — TRIAMCINOLONE ACETONIDE 1 MG/G
CREAM TOPICAL
COMMUNITY
Start: 2024-07-03

## 2024-08-05 RX ORDER — TESTOSTERONE 100 MG
100 PELLET (EA) IMPLANTATION
COMMUNITY
Start: 2022-08-04

## 2024-08-05 NOTE — ASSESSMENT & PLAN NOTE
Patient presented for annual physical exam.  Examination is unremarkable today.  Due for routine blood work which was ordered.  Discussed his CPAP.  Will update his immunizations with shingles vaccine.  Colon cancer screening is current.  We reviewed his diet and cardiac risk.  Will check CT calcium score to further stratify his risk.

## 2024-08-05 NOTE — PROGRESS NOTES
Adult Annual Physical  Name: Jose R Solano      : 1965      MRN: [id number]  Encounter Provider: Mark Lutz DO  Encounter Date: 2024   Encounter department: [facility]    Assessment & Plan   1. Screening for prostate cancer  -     PSA, Total Screen; Future; Expected date: 2024  2. Screening for cardiovascular condition  -     Lipid Panel with Direct LDL reflex; Future; Expected date: 2024  -     CT coronary calcium score; Future; Expected date: 2024  3. Screening for deficiency anemia  -     CBC and differential; Future; Expected date: 2024  4. Screening for diabetes mellitus  -     Comprehensive metabolic panel; Future; Expected date: 2024  5. Screening for thyroid disorder  -     TSH, 3rd generation with Free T4 reflex; Future; Expected date: 2024  6. Encounter for immunization  -     Zoster Vaccine Recombinant IM  7. Benign prostatic hyperplasia with weak urinary stream  -     Ambulatory Referral to Urology; Future  8. Annual physical exam  Assessment & Plan:  Patient presented for annual physical exam.  Examination is unremarkable today.  Due for routine blood work which was ordered.  Discussed his CPAP.  Will update his immunizations with shingles vaccine.  Colon cancer screening is current.  We reviewed his diet and cardiac risk.  Will check CT calcium score to further stratify his risk.  Immunizations and preventive care screenings were discussed with patient today. Appropriate education was printed on patient's after visit summary.    Discussed risks and benefits of prostate cancer screening. We discussed the controversial history of PSA screening for prostate cancer in the [place] as well as the risk of over detection and over treatment of prostate cancer by way of PSA screening.  The patient understands that PSA blood testing is an imperfect way to screen for prostate cancer and that elevated PSA levels in the blood may  "also be caused by infection, inflammation, prostatic trauma or manipulation, urological procedures, or by benign prostatic enlargement.    The role of the digital rectal examination in prostate cancer screening was also discussed and I discussed with him that there is large interobserver variability in the findings of digital rectal examination.    Counseling:  Alcohol/drug use: discussed moderation in alcohol intake, the recommendations for healthy alcohol use, and avoidance of illicit drug use.  Dental Health: discussed importance of regular tooth brushing, flossing, and dental visits.  Injury prevention: discussed safety/seat belts, safety helmets, smoke detectors, carbon dioxide detectors, and smoking near bedding or upholstery.  Exercise: the importance of regular exercise/physical activity was discussed. Recommend exercise 3-5 times per week for at least 30 minutes.          History of Present Illness     Adult Annual Physical:  Patient presents for annual physical. Patient presented for annual physical exam.  Overall feels well.  Has had some sleep issues but is now on CPAP..     Diet and Physical Activity:  - Diet/Nutrition: high fat diet and limited junk food.  - Exercise: moderate cardiovascular exercise and 1-2 times a week on average.    Depression Screening:  - PHQ-2 Score: 0    General Health:  - Sleep:. cpap  - Hearing: normal hearing bilateral ears.  - Vision: wears glasses and goes for regular eye exams.  - Dental: regular dental visits.     Health:    - Urinary symptoms: weak urinary stream.     Review of Systems   Constitutional: Negative.    Respiratory: Negative.     Cardiovascular: Negative.    Gastrointestinal: Negative.    Genitourinary: Negative.    Musculoskeletal: Negative.    Psychiatric/Behavioral: Negative.           Objective     /84   Pulse 57   Temp (!) 97.2 °F (36.2 °C)   Ht 5' 11.26\" (1.81 m)   Wt 95.3 kg (210 lb)   SpO2 97%   BMI 29.08 kg/m²     Physical Exam  Vitals " and nursing note reviewed.   Constitutional:       Appearance: Normal appearance. He is well-developed.   HENT:      Head: Normocephalic.      Right Ear: External ear normal.      Left Ear: External ear normal.      Nose: Nose normal.      Mouth/Throat:      Mouth: Mucous membranes are moist.   Eyes:      Conjunctiva/sclera: Conjunctivae normal.      Pupils: Pupils are equal, round, and reactive to light.   Cardiovascular:      Rate and Rhythm: Normal rate and regular rhythm.      Heart sounds: Normal heart sounds.   Pulmonary:      Effort: Pulmonary effort is normal.      Breath sounds: Normal breath sounds.   Abdominal:      General: Bowel sounds are normal.      Palpations: Abdomen is soft.   Musculoskeletal:         General: Normal range of motion.      Cervical back: Normal range of motion and neck supple.   Skin:     General: Skin is warm and dry.   Neurological:      Mental Status: He is alert.   Psychiatric:         Mood and Affect: Mood normal.         Behavior: Behavior normal.         Thought Content: Thought content normal.         Judgment: Judgment normal.

## 2024-08-24 ENCOUNTER — HOSPITAL ENCOUNTER (OUTPATIENT)
Dept: CT IMAGING | Facility: HOSPITAL | Age: 59
Discharge: HOME | End: 2024-08-24
Payer: COMMERCIAL

## 2024-08-24 DIAGNOSIS — Z13.6 SCREENING FOR CARDIOVASCULAR CONDITION: ICD-10-CM

## 2024-08-24 PROCEDURE — 75571 CT HRT W/O DYE W/CA TEST: CPT

## 2024-08-27 ENCOUNTER — APPOINTMENT (OUTPATIENT)
Dept: LAB | Facility: CLINIC | Age: 59
End: 2024-08-27
Payer: COMMERCIAL

## 2024-08-27 DIAGNOSIS — Z13.29 SCREENING FOR THYROID DISORDER: ICD-10-CM

## 2024-08-27 DIAGNOSIS — Z13.0 SCREENING FOR DEFICIENCY ANEMIA: ICD-10-CM

## 2024-08-27 DIAGNOSIS — Z12.5 SCREENING FOR PROSTATE CANCER: ICD-10-CM

## 2024-08-27 DIAGNOSIS — Z13.6 SCREENING FOR CARDIOVASCULAR CONDITION: ICD-10-CM

## 2024-08-27 DIAGNOSIS — Z13.1 SCREENING FOR DIABETES MELLITUS: ICD-10-CM

## 2024-08-27 LAB
ALBUMIN SERPL BCG-MCNC: 4.1 G/DL (ref 3.5–5)
ALP SERPL-CCNC: 57 U/L (ref 34–104)
ALT SERPL W P-5'-P-CCNC: 23 U/L (ref 7–52)
ANION GAP SERPL CALCULATED.3IONS-SCNC: 8 MMOL/L (ref 4–13)
AST SERPL W P-5'-P-CCNC: 18 U/L (ref 13–39)
BASOPHILS # BLD AUTO: 0.04 THOUSANDS/ÂΜL (ref 0–0.1)
BASOPHILS NFR BLD AUTO: 1 % (ref 0–1)
BILIRUB SERPL-MCNC: 0.4 MG/DL (ref 0.2–1)
BUN SERPL-MCNC: 20 MG/DL (ref 5–25)
CALCIUM SERPL-MCNC: 9.2 MG/DL (ref 8.4–10.2)
CHLORIDE SERPL-SCNC: 105 MMOL/L (ref 96–108)
CHOLEST SERPL-MCNC: 142 MG/DL
CO2 SERPL-SCNC: 30 MMOL/L (ref 21–32)
CREAT SERPL-MCNC: 0.97 MG/DL (ref 0.6–1.3)
EOSINOPHIL # BLD AUTO: 0.16 THOUSAND/ÂΜL (ref 0–0.61)
EOSINOPHIL NFR BLD AUTO: 4 % (ref 0–6)
ERYTHROCYTE [DISTWIDTH] IN BLOOD BY AUTOMATED COUNT: 12.6 % (ref 11.6–15.1)
GFR SERPL CREATININE-BSD FRML MDRD: 86 ML/MIN/1.73SQ M
GLUCOSE P FAST SERPL-MCNC: 85 MG/DL (ref 65–99)
HCT VFR BLD AUTO: 46.4 % (ref 36.5–49.3)
HDLC SERPL-MCNC: 41 MG/DL
HGB BLD-MCNC: 15 G/DL (ref 12–17)
IMM GRANULOCYTES # BLD AUTO: 0.02 THOUSAND/UL (ref 0–0.2)
IMM GRANULOCYTES NFR BLD AUTO: 1 % (ref 0–2)
LDLC SERPL CALC-MCNC: 87 MG/DL (ref 0–100)
LYMPHOCYTES # BLD AUTO: 1.12 THOUSANDS/ÂΜL (ref 0.6–4.47)
LYMPHOCYTES NFR BLD AUTO: 27 % (ref 14–44)
MCH RBC QN AUTO: 30.1 PG (ref 26.8–34.3)
MCHC RBC AUTO-ENTMCNC: 32.3 G/DL (ref 31.4–37.4)
MCV RBC AUTO: 93 FL (ref 82–98)
MONOCYTES # BLD AUTO: 0.43 THOUSAND/ÂΜL (ref 0.17–1.22)
MONOCYTES NFR BLD AUTO: 10 % (ref 4–12)
NEUTROPHILS # BLD AUTO: 2.42 THOUSANDS/ÂΜL (ref 1.85–7.62)
NEUTS SEG NFR BLD AUTO: 57 % (ref 43–75)
NRBC BLD AUTO-RTO: 0 /100 WBCS
PLATELET # BLD AUTO: 190 THOUSANDS/UL (ref 149–390)
PMV BLD AUTO: 10.7 FL (ref 8.9–12.7)
POTASSIUM SERPL-SCNC: 4.1 MMOL/L (ref 3.5–5.3)
PROT SERPL-MCNC: 6.4 G/DL (ref 6.4–8.4)
PSA SERPL-MCNC: 2.25 NG/ML (ref 0–4)
RBC # BLD AUTO: 4.99 MILLION/UL (ref 3.88–5.62)
SODIUM SERPL-SCNC: 143 MMOL/L (ref 135–147)
TRIGL SERPL-MCNC: 71 MG/DL
TSH SERPL DL<=0.05 MIU/L-ACNC: 2.24 UIU/ML (ref 0.45–4.5)
WBC # BLD AUTO: 4.19 THOUSAND/UL (ref 4.31–10.16)

## 2024-08-27 PROCEDURE — 36415 COLL VENOUS BLD VENIPUNCTURE: CPT

## 2024-08-27 PROCEDURE — 80061 LIPID PANEL: CPT

## 2024-08-27 PROCEDURE — 85025 COMPLETE CBC W/AUTO DIFF WBC: CPT

## 2024-08-27 PROCEDURE — 84443 ASSAY THYROID STIM HORMONE: CPT

## 2024-08-27 PROCEDURE — 80053 COMPREHEN METABOLIC PANEL: CPT

## 2024-08-27 PROCEDURE — G0103 PSA SCREENING: HCPCS

## 2024-10-03 ENCOUNTER — CONSULT (OUTPATIENT)
Dept: UROLOGY | Facility: MEDICAL CENTER | Age: 59
End: 2024-10-03
Payer: COMMERCIAL

## 2024-10-03 VITALS
DIASTOLIC BLOOD PRESSURE: 90 MMHG | WEIGHT: 210 LBS | HEART RATE: 76 BPM | HEIGHT: 71 IN | SYSTOLIC BLOOD PRESSURE: 160 MMHG | OXYGEN SATURATION: 97 % | BODY MASS INDEX: 29.4 KG/M2

## 2024-10-03 DIAGNOSIS — N40.1 BENIGN PROSTATIC HYPERPLASIA WITH WEAK URINARY STREAM: Primary | ICD-10-CM

## 2024-10-03 DIAGNOSIS — R39.12 BENIGN PROSTATIC HYPERPLASIA WITH WEAK URINARY STREAM: Primary | ICD-10-CM

## 2024-10-03 DIAGNOSIS — Z12.5 SCREENING FOR PROSTATE CANCER: ICD-10-CM

## 2024-10-03 LAB — POST-VOID RESIDUAL VOLUME, ML POC: 71 ML

## 2024-10-03 PROCEDURE — 99204 OFFICE O/P NEW MOD 45 MIN: CPT

## 2024-10-03 PROCEDURE — 51798 US URINE CAPACITY MEASURE: CPT

## 2024-10-03 RX ORDER — ALFUZOSIN HYDROCHLORIDE 10 MG/1
10 TABLET, EXTENDED RELEASE ORAL DAILY
Qty: 30 TABLET | Refills: 3 | Status: SHIPPED | OUTPATIENT
Start: 2024-10-03 | End: 2025-01-31

## 2024-10-03 NOTE — ASSESSMENT & PLAN NOTE
Patient denies a family history of prostate cancer  Patient's most recent PSA was performed 8/27/2024 and found to be 2.247.  Refer to PSA trend below.  SEVERO performed today.  Palpably benign prostate.  Refer to physical exam findings.  Patient will be due for a repeat PSA in 1 year from his last and should undergo a SEVERO at that time.    Lab Results   Component Value Date    PSA 2.247 08/27/2024    PSA 1.8 02/10/2023    PSA 1.3 08/20/2021

## 2024-10-03 NOTE — PROGRESS NOTES
10/3/2024      Assessment and Plan    58 y.o. male new patient to [facility] for urology    Benign prostatic hyperplasia with weak urinary stream  AUA symptom score 20  PVR performed the office today found to be 71 mL  Patient has experiencing urinary frequency, weakened urinary stream, postvoid dribbling, and feelings of incomplete bladder emptying.  We discussed pharmacotherapy including alpha blockers versus 5 alpha reductase inhibitors and daily low-dose Cialis.  Patient has been previously trialed on Flomax over a decade ago and experienced side effects that he cannot recall.  The patient will trial alfuzosin 10 mg daily with food at night.  Discussed side effects of dizziness/lightheadedness and tiredness.  Advised the patient to call the office if the side effects are unbearable or if  he is not experiencing any benefit.  Patient will return to the office in 3 months to reevaluate lower urinary tract symptoms and undergo PVR.  If symptoms persist then we may consider combination therapy with either finasteride or dutasteride.      Screening for prostate cancer  Patient denies a family history of prostate cancer  Patient's most recent PSA was performed 8/27/2024 and found to be 2.247.  Refer to PSA trend below.  SEVERO performed today.  Palpably benign prostate.  Refer to physical exam findings.  Patient will be due for a repeat PSA in 1 year from his last and should undergo a SEVERO at that time.    Lab Results   Component Value Date    PSA 2.247 08/27/2024    PSA 1.8 02/10/2023    PSA 1.3 08/20/2021            History of Present Illness  Jose R Solano is a 58 y.o. male here for evaluation of BPH with weak urinary stream.  Patient was previously seen in our office for a vasectomy procedure which was performed 1/11/2021.  Patient has a significant past medical history for obstructive sleep apnea and erectile dysfunction.  Patient's erectile function is controlled on sildenafil 100 mg as needed 1 hour prior  to sexual intercourse.  Patient is not currently on any pharmacotherapy for his lower urinary tract symptoms.  AUA symptom score of 20.  Today, the patient reports a weakened urinary stream, feelings of incomplete bladder emptying, worsening urinary frequency, and postvoid dribbling.  Patient notes that he was trialed on Flomax back in his early 40s and experienced side effects that he cannot remember, but discontinued the medication due to the side effects back then.  Additionally, the patient notes that he is unsure if the Flomax assisted with bettering his urination at that time.  Patient notes that he has a family history of an enlarged prostate, but denies a family history of prostate cancer.  Patient states that he believes his dad's prostate was sized around 300 g.  Otherwise, the patient denies dysuria, hematuria, flank pain, worsening urinary urgency, or urinary incontinence.        Review of Systems   Constitutional:  Negative for chills and fever.   HENT:  Negative for ear pain and sore throat.    Eyes:  Negative for pain and visual disturbance.   Respiratory:  Negative for cough and shortness of breath.    Cardiovascular:  Negative for chest pain and palpitations.   Gastrointestinal:  Negative for abdominal pain and vomiting.   Genitourinary:  Positive for difficulty urinating and frequency. Negative for decreased urine volume, dysuria, flank pain, hematuria and urgency.   Musculoskeletal:  Negative for arthralgias and back pain.   Skin:  Negative for color change and rash.   Neurological:  Negative for seizures and syncope.   All other systems reviewed and are negative.          AUA SYMPTOM SCORE      Flowsheet Row Most Recent Value   AUA SYMPTOM SCORE    How often have you had a sensation of not emptying your bladder completely after you finished urinating? 4 (P)     How often have you had to urinate again less than two hours after you finished urinating? 3 (P)     How often have you found you stopped  "and started again several times when you urinate? 5 (P)     How often have you found it difficult to postpone urination? 1 (P)     How often have you had a weak urinary stream? 5 (P)     How often have you had to push or strain to begin urination? 1 (P)     How many times did you most typically get up to urinate from the time you went to bed at night until the time you got up in the morning? 1 (P)     Quality of Life: If you were to spend the rest of your life with your urinary condition just the way it is now, how would you feel about that? 3 (P)     AUA SYMPTOM SCORE 20 (P)               Vitals  Vitals:    10/03/24 1350   BP: 160/90   BP Location: Left arm   Patient Position: Sitting   Cuff Size: Standard   Pulse: 76   SpO2: 97%   Weight: 95.3 kg (210 lb)   Height: 5' 11\" (1.803 m)       Physical Exam  Vitals reviewed.   Constitutional:       General: He is not in acute distress.     Appearance: Normal appearance. He is not ill-appearing.   HENT:      Head: Normocephalic and atraumatic.      Nose: Nose normal.   Eyes:      General: No scleral icterus.  Pulmonary:      Effort: No respiratory distress.   Abdominal:      General: Abdomen is flat. There is no distension.      Palpations: Abdomen is soft.      Tenderness: There is no abdominal tenderness.   Genitourinary:     Comments: Normal phallus, testes descended bilaterally and smooth without nodularity.  SEVERO performed today.  Prostate estimated to be about 50 to 55 g and smooth bilaterally without nodularity or induration.  Musculoskeletal:         General: Normal range of motion.      Cervical back: Normal range of motion.   Skin:     General: Skin is warm.      Coloration: Skin is not jaundiced.   Neurological:      Mental Status: He is alert and oriented to person, place, and time.      Gait: Gait normal.   Psychiatric:         Mood and Affect: Mood normal.         Behavior: Behavior normal.           Past History  Past Medical History:   Diagnosis Date    " BPH (benign prostatic hyperplasia)     Disease of thyroid gland     Retinitis pigmentosa      Social History     Socioeconomic History    Marital status: /Civil Union     Spouse name: None    Number of children: None    Years of education: None    Highest education level: None   Occupational History    None   Tobacco Use    Smoking status: Never     Passive exposure: Never    Smokeless tobacco: Never   Vaping Use    Vaping status: Never Used   Substance and Sexual Activity    Alcohol use: Yes     Alcohol/week: 3.0 standard drinks of alcohol     Types: 3 Standard drinks or equivalent per week     Comment: casual    Drug use: Never    Sexual activity: Yes     Partners: Female     Birth control/protection: Male Sterilization   Other Topics Concern    None   Social History Narrative    None     Social Determinants of Health     Financial Resource Strain: Low Risk  (2/10/2023)    Overall Financial Resource Strain (CARDIA)     Difficulty of Paying Living Expenses: Not hard at all   Food Insecurity: No Food Insecurity (2/10/2023)    Hunger Vital Sign     Worried About Running Out of Food in the Last Year: Never true     Ran Out of Food in the Last Year: Never true   Transportation Needs: No Transportation Needs (2/10/2023)    PRAPARE - Transportation     Lack of Transportation (Medical): No     Lack of Transportation (Non-Medical): No   Physical Activity: Sufficiently Active (2/10/2023)    Exercise Vital Sign     Days of Exercise per Week: 6 days     Minutes of Exercise per Session: 40 min   Stress: No Stress Concern Present (2/10/2023)    [employer] [place] [employer] - Occupational Stress Questionnaire     Feeling of Stress : Not at all   Social Connections: Moderately Isolated (2/10/2023)    Social Connection and Isolation Panel [NHANES]     Frequency of Communication with Friends and Family: More than three times a week     Frequency of Social Gatherings with Friends and Family: More than three times  a week     Attends [employer] Services: Never     Active Member of Clubs or Organizations: No     Attends Club or Organization Meetings: Never     Marital Status:    Intimate Partner Violence: Not At Risk (2/10/2023)    Humiliation, Afraid, Rape, and Kick questionnaire     Fear of Current or Ex-Partner: No     Emotionally Abused: No     Physically Abused: No     Sexually Abused: No   Housing Stability: Unknown (2/10/2023)    Housing Stability Vital Sign     Unable to Pay for Housing in the Last Year: No     Number of Places Lived in the Last Year: Not on file     Unstable Housing in the Last Year: No     Social History     Tobacco Use   Smoking Status Never    Passive exposure: Never   Smokeless Tobacco Never     Family History   Problem Relation Age of Onset    Hypertension Family     Other Family     Heart disease Mother     Dementia Mother     Hypertension Mother     Arthritis Mother     Vision loss Mother         Retinitis pigmentosa    Cancer Father         Skin cancer    Dementia Father     Hypertension Father     Diabetes Father         Type 2       The following portions of the patient's history were reviewed and updated as appropriate: allergies, current medications, past medical history, past social history, past surgical history and problem list.    Results  No results found for this or any previous visit (from the past 1 hour(s)).]  Lab Results   Component Value Date    PSA 2.247 08/27/2024    PSA 1.8 02/10/2023    PSA 1.3 08/20/2021    PSA 1.2 10/28/2019     Lab Results   Component Value Date    GLUCOSE 88 09/17/2015    CALCIUM 9.5 10/01/2024     09/17/2015    K 4.3 10/01/2024    CO2 31 10/01/2024     10/01/2024    BUN 19 10/01/2024    CREATININE 1.00 10/01/2024     Lab Results   Component Value Date    WBC 4.19 (L) 08/27/2024    HGB 15.0 08/27/2024    HCT 46.4 08/27/2024    MCV 93 08/27/2024     08/27/2024

## 2024-10-03 NOTE — ASSESSMENT & PLAN NOTE
AUA symptom score 20  PVR performed the office today found to be 71 mL  Patient has experiencing urinary frequency, weakened urinary stream, postvoid dribbling, and feelings of incomplete bladder emptying.  We discussed pharmacotherapy including alpha blockers versus 5 alpha reductase inhibitors and daily low-dose Cialis.  Patient has been previously trialed on Flomax over a decade ago and experienced side effects that he cannot recall.  The patient will trial alfuzosin 10 mg daily with food at night.  Discussed side effects of dizziness/lightheadedness and tiredness.  Advised the patient to call the office if the side effects are unbearable or if  he is not experiencing any benefit.  Patient will return to the office in 3 months to reevaluate lower urinary tract symptoms and undergo PVR.  If symptoms persist then we may consider combination therapy with either finasteride or dutasteride.

## 2024-11-02 PROBLEM — Z12.5 SCREENING FOR PROSTATE CANCER: Status: RESOLVED | Noted: 2024-10-03 | Resolved: 2024-11-02

## 2025-01-17 ENCOUNTER — OFFICE VISIT (OUTPATIENT)
Dept: UROLOGY | Facility: MEDICAL CENTER | Age: 60
End: 2025-01-17
Payer: COMMERCIAL

## 2025-01-17 VITALS
OXYGEN SATURATION: 98 % | SYSTOLIC BLOOD PRESSURE: 130 MMHG | HEART RATE: 71 BPM | WEIGHT: 210 LBS | HEIGHT: 71 IN | BODY MASS INDEX: 29.4 KG/M2 | DIASTOLIC BLOOD PRESSURE: 90 MMHG

## 2025-01-17 DIAGNOSIS — N40.1 BENIGN PROSTATIC HYPERPLASIA WITH WEAK URINARY STREAM: Primary | ICD-10-CM

## 2025-01-17 DIAGNOSIS — Z12.5 SCREENING FOR PROSTATE CANCER: ICD-10-CM

## 2025-01-17 DIAGNOSIS — R39.12 BENIGN PROSTATIC HYPERPLASIA WITH WEAK URINARY STREAM: Primary | ICD-10-CM

## 2025-01-17 LAB — POST-VOID RESIDUAL VOLUME, ML POC: 48 ML

## 2025-01-17 PROCEDURE — 99213 OFFICE O/P EST LOW 20 MIN: CPT

## 2025-01-17 PROCEDURE — 51798 US URINE CAPACITY MEASURE: CPT

## 2025-01-17 RX ORDER — FINASTERIDE 5 MG/1
5 TABLET, FILM COATED ORAL DAILY
Qty: 30 TABLET | Refills: 3 | Status: SHIPPED | OUTPATIENT
Start: 2025-01-17

## 2025-01-17 RX ORDER — LEVOTHYROXINE SODIUM 25 UG/1
25 TABLET ORAL DAILY
COMMUNITY
Start: 2025-01-10

## 2025-01-17 RX ORDER — IBUPROFEN 800 MG/1
800 TABLET, FILM COATED ORAL EVERY 8 HOURS PRN
COMMUNITY

## 2025-01-17 RX ORDER — ANASTROZOLE 1 MG/1
1 TABLET ORAL DAILY
COMMUNITY
Start: 2024-10-11

## 2025-01-17 RX ORDER — TADALAFIL 5 MG/1
1 TABLET ORAL DAILY
COMMUNITY
Start: 2024-10-11

## 2025-01-17 NOTE — PROGRESS NOTES
1/17/2025      Assessment and Plan    59 y.o. male managed by our office    Benign prostatic hyperplasia with weak urinary stream  AUA symptom score 11  PVR performed the office today found to be 48 mL, which is improved compared to the prior 71 mL  Patient currently controlled on alfuzosin 10 mg once daily and Cialis 5 mg once daily  We discussed further pharmacotherapy with triple therapy with a 5 alpha reductase inhibitor such as finasteride.  Patient will trial triple therapy of alfuzosin 10 mg, Cialis 5 mg, and finasteride 5 mg once daily for treatment of his lower urinary tract symptoms.  Patient will return to the office in 3 months to reevaluate and if his symptoms continue to persist and the patient should undergo cystoscopy for further bladder outlet obstruction workup    Screening for prostate cancer  Patient denies a family history of prostate cancer  Patient's most recent PSA was performed 8/27/2024 and found to be 2.247.  Refer to PSA trend below.  SEVERO performed today.  Palpably benign prostate.  Refer to physical exam findings.  Patient will be due for a repeat PSA in 1 year from his last and should undergo a SEVERO at that time.    Lab Results   Component Value Date    PSA 2.247 08/27/2024    PSA 1.8 02/10/2023    PSA 1.3 08/20/2021            History of Present Illness  Jose R Solano is a 59 y.o. male here for evaluation of BPH with weak urinary stream.  Patient was previously seen in our office for a vasectomy procedure which was performed 1/11/2021.  Patient has a significant past medical history for obstructive sleep apnea and erectile dysfunction.  Patient's erectile function is controlled on sildenafil 100 mg as needed 1 hour prior to sexual intercourse.  At our last office visit, the patient reports a weakened urinary stream, feelings of incomplete bladder emptying, worsening urinary frequency, and postvoid dribbling.  Patient notes that he was trialed on Flomax back in his early 40s and  experienced side effects that he cannot remember, but discontinued the medication due to the side effects back then.  Additionally, the patient notes that he is unsure if the Flomax assisted with bettering his urination at that time.  Patient notes that he has a family history of an enlarged prostate, but denies a family history of prostate cancer.  Patient states that he believes his dad's prostate was sized around 300 g.  Patient was initiated on alfuzosin 10 mg once daily for treatment of his lower urinary tract symptoms at our last visit.  PVR in the office today was found to be 48 mL.    Today, the patient reports that he has noted subtle improvement in his intermittent urinary stream since initiating therapy with the alfuzosin.  Patient notes that when he drinks caffeinated beverages, that he does have increased urinary frequency as well as increased episodes of the intermittent urinary stream.  Patient denies dysuria, hematuria, flank pain, or worsening urinary urgency.             Review of Systems   Constitutional:  Negative for chills and fever.   HENT:  Negative for ear pain and sore throat.    Eyes:  Negative for pain and visual disturbance.   Respiratory:  Negative for cough and shortness of breath.    Cardiovascular:  Negative for chest pain and palpitations.   Gastrointestinal:  Negative for abdominal pain and vomiting.   Genitourinary:  Positive for difficulty urinating and frequency. Negative for decreased urine volume, dysuria, flank pain, hematuria and urgency.   Musculoskeletal:  Negative for arthralgias and back pain.   Skin:  Negative for color change and rash.   Neurological:  Negative for seizures and syncope.   All other systems reviewed and are negative.          AUA SYMPTOM SCORE      Flowsheet Row Most Recent Value   AUA SYMPTOM SCORE    How often have you had a sensation of not emptying your bladder completely after you finished urinating? 2   How often have you had to urinate again less  "than two hours after you finished urinating? 1   How often have you found you stopped and started again several times when you urinate? 2   How often have you found it difficult to postpone urination? 1   How often have you had a weak urinary stream? 4   How often have you had to push or strain to begin urination? 1   How many times did you most typically get up to urinate from the time you went to bed at night until the time you got up in the morning? 0   Quality of Life: If you were to spend the rest of your life with your urinary condition just the way it is now, how would you feel about that? 2   AUA SYMPTOM SCORE 11             Vitals  Vitals:    01/17/25 1103   BP: 130/90   BP Location: Left arm   Patient Position: Sitting   Cuff Size: Standard   Pulse: 71   SpO2: 98%   Weight: 95.3 kg (210 lb)   Height: 5' 11\" (1.803 m)       Physical Exam  Vitals reviewed.   Constitutional:       General: He is not in acute distress.     Appearance: Normal appearance. He is not ill-appearing.   HENT:      Head: Normocephalic and atraumatic.      Nose: Nose normal.   Eyes:      General: No scleral icterus.  Pulmonary:      Effort: No respiratory distress.   Abdominal:      General: Abdomen is flat. There is no distension.      Palpations: Abdomen is soft.      Tenderness: There is no abdominal tenderness.   Musculoskeletal:         General: Normal range of motion.      Cervical back: Normal range of motion.   Skin:     General: Skin is warm.      Coloration: Skin is not jaundiced.   Neurological:      Mental Status: He is alert and oriented to person, place, and time.      Gait: Gait normal.   Psychiatric:         Mood and Affect: Mood normal.         Behavior: Behavior normal.           Past History  Past Medical History:   Diagnosis Date    BPH (benign prostatic hyperplasia)     Disease of thyroid gland     Retinitis pigmentosa      Social History     Socioeconomic History    Marital status: /Civil Union     Spouse " name: None    Number of children: None    Years of education: None    Highest education level: None   Occupational History    None   Tobacco Use    Smoking status: Never     Passive exposure: Never    Smokeless tobacco: Never   Vaping Use    Vaping status: Never Used   Substance and Sexual Activity    Alcohol use: Yes     Alcohol/week: 3.0 standard drinks of alcohol     Types: 3 Standard drinks or equivalent per week     Comment: casual    Drug use: Never    Sexual activity: Yes     Partners: Female     Birth control/protection: Male Sterilization   Other Topics Concern    None   Social History Narrative    None     Social Drivers of Health     Financial Resource Strain: Low Risk  (2/10/2023)    Overall Financial Resource Strain (CARDIA)     Difficulty of Paying Living Expenses: Not hard at all   Food Insecurity: No Food Insecurity (2/10/2023)    Hunger Vital Sign     Worried About Running Out of Food in the Last Year: Never true     Ran Out of Food in the Last Year: Never true   Transportation Needs: No Transportation Needs (2/10/2023)    PRAPARE - Transportation     Lack of Transportation (Medical): No     Lack of Transportation (Non-Medical): No   Physical Activity: Sufficiently Active (2/10/2023)    Exercise Vital Sign     Days of Exercise per Week: 6 days     Minutes of Exercise per Session: 40 min   Stress: No Stress Concern Present (2/10/2023)    [employer] [place] [employer] - Occupational Stress Questionnaire     Feeling of Stress : Not at all   Social Connections: Moderately Isolated (2/10/2023)    Social Connection and Isolation Panel [NHANES]     Frequency of Communication with Friends and Family: More than three times a week     Frequency of Social Gatherings with Friends and Family: More than three times a week     Attends Church Services: Never     Active Member of Clubs or Organizations: No     Attends Club or Organization Meetings: Never     Marital Status:    Intimate Partner  Violence: Not At Risk (2/10/2023)    Humiliation, Afraid, Rape, and Kick questionnaire     Fear of Current or Ex-Partner: No     Emotionally Abused: No     Physically Abused: No     Sexually Abused: No   Housing Stability: Unknown (2/10/2023)    Housing Stability Vital Sign     Unable to Pay for Housing in the Last Year: No     Number of Places Lived in the Last Year: Not on file     Unstable Housing in the Last Year: No     Social History     Tobacco Use   Smoking Status Never    Passive exposure: Never   Smokeless Tobacco Never     Family History   Problem Relation Age of Onset    Hypertension Family     Other Family     Heart disease Mother     Dementia Mother     Hypertension Mother     Arthritis Mother     Vision loss Mother         Retinitis pigmentosa    Cancer Father         Skin cancer    Dementia Father     Hypertension Father     Diabetes Father         Type 2       The following portions of the patient's history were reviewed and updated as appropriate: allergies, current medications, past medical history, past social history, past surgical history and problem list.    Results  No results found for this or any previous visit (from the past hour).  ]  Lab Results   Component Value Date    PSA 2.247 08/27/2024    PSA 1.8 02/10/2023    PSA 1.3 08/20/2021    PSA 1.2 10/28/2019     Lab Results   Component Value Date    GLUCOSE 88 09/17/2015    CALCIUM 9.5 10/01/2024     09/17/2015    K 4.3 10/01/2024    CO2 31 10/01/2024     10/01/2024    BUN 19 10/01/2024    CREATININE 1.00 10/01/2024     Lab Results   Component Value Date    WBC 4.19 (L) 08/27/2024    HGB 15.0 08/27/2024    HCT 46.4 08/27/2024    MCV 93 08/27/2024     08/27/2024

## 2025-01-17 NOTE — ASSESSMENT & PLAN NOTE
AUA symptom score 11  PVR performed the office today found to be 48 mL, which is improved compared to the prior 71 mL  Patient currently controlled on alfuzosin 10 mg once daily and Cialis 5 mg once daily  We discussed further pharmacotherapy with triple therapy with a 5 alpha reductase inhibitor such as finasteride.  Patient will trial triple therapy of alfuzosin 10 mg, Cialis 5 mg, and finasteride 5 mg once daily for treatment of his lower urinary tract symptoms.  Patient will return to the office in 3 months to reevaluate and if his symptoms continue to persist and the patient should undergo cystoscopy for further bladder outlet obstruction workup

## 2025-01-28 DIAGNOSIS — R39.12 BENIGN PROSTATIC HYPERPLASIA WITH WEAK URINARY STREAM: ICD-10-CM

## 2025-01-28 DIAGNOSIS — N40.1 BENIGN PROSTATIC HYPERPLASIA WITH WEAK URINARY STREAM: ICD-10-CM

## 2025-01-28 RX ORDER — ALFUZOSIN HYDROCHLORIDE 10 MG/1
10 TABLET, EXTENDED RELEASE ORAL DAILY
Qty: 30 TABLET | Refills: 5 | Status: SHIPPED | OUTPATIENT
Start: 2025-01-28

## 2025-02-18 ENCOUNTER — PATIENT MESSAGE (OUTPATIENT)
Dept: UROLOGY | Facility: MEDICAL CENTER | Age: 60
End: 2025-02-18

## 2025-02-19 DIAGNOSIS — N40.1 BENIGN PROSTATIC HYPERPLASIA WITH WEAK URINARY STREAM: Primary | ICD-10-CM

## 2025-02-19 DIAGNOSIS — R39.12 BENIGN PROSTATIC HYPERPLASIA WITH WEAK URINARY STREAM: Primary | ICD-10-CM

## 2025-02-19 RX ORDER — TADALAFIL 5 MG/1
5 TABLET ORAL DAILY
Qty: 10 TABLET | Refills: 0 | Status: SHIPPED | OUTPATIENT
Start: 2025-02-19 | End: 2025-03-21

## 2025-02-19 NOTE — TELEPHONE ENCOUNTER
Yousif Solano to [facility] (supporting Lon Flores PA-C)         2/18/25  9:12 PM  Any chance you can call me in an Rx for 5mg tadalafil.  The Dr that I go to for testosterone pelleting had given me a script for it but he only ever calls in one fill at a time and then I have to call multiple times to get them to do a refill.       Thanks.

## 2025-04-08 ENCOUNTER — TELEPHONE (OUTPATIENT)
Dept: SLEEP CENTER | Facility: CLINIC | Age: 60
End: 2025-04-08

## 2025-04-10 ENCOUNTER — OFFICE VISIT (OUTPATIENT)
Dept: SLEEP CENTER | Facility: CLINIC | Age: 60
End: 2025-04-10
Payer: COMMERCIAL

## 2025-04-10 VITALS
HEART RATE: 69 BPM | DIASTOLIC BLOOD PRESSURE: 82 MMHG | OXYGEN SATURATION: 98 % | HEIGHT: 71 IN | BODY MASS INDEX: 30.24 KG/M2 | SYSTOLIC BLOOD PRESSURE: 124 MMHG | WEIGHT: 216 LBS

## 2025-04-10 DIAGNOSIS — G47.33 OBSTRUCTIVE SLEEP APNEA: Primary | ICD-10-CM

## 2025-04-10 PROBLEM — M25.519 SHOULDER PAIN: Status: ACTIVE | Noted: 2020-07-16

## 2025-04-10 PROBLEM — M75.20 BICEPS TENDINITIS: Status: ACTIVE | Noted: 2020-07-16

## 2025-04-10 PROCEDURE — 99214 OFFICE O/P EST MOD 30 MIN: CPT | Performed by: PHYSICIAN ASSISTANT

## 2025-04-10 NOTE — ASSESSMENT & PLAN NOTE
Patient has a history of moderate obstructive sleep apnea.  His sleep apnea is very well treated with use of CPAP.  He finds it to be beneficial and effective.  He will continue to use nightly and follow-up in 1 year or sooner if he has any concerns.  Orders:    PAP DME Resupply/Reorder

## 2025-04-10 NOTE — PROGRESS NOTES
Name: Jose R Solano      : 1965      MRN: [id number]  Encounter Provider: Aida Shanks PA-C  Encounter Date: 4/10/2025   Encounter department: [facility]  :  Assessment & Plan  Obstructive sleep apnea  Patient has a history of moderate obstructive sleep apnea.  His sleep apnea is very well treated with use of CPAP.  He finds it to be beneficial and effective.  He will continue to use nightly and follow-up in 1 year or sooner if he has any concerns.  Orders:    PAP DME Resupply/Reorder        History of Present Illness   HPI Yousif Solano is a 59-year-old male who presents today in follow-up of moderate obstructive sleep apnea.  He completed a home sleep study on 2022 with a ORACIO of 17.9 for symptoms of loud snoring, witnessed apneic episodes, morning headaches, dry mouth, and excessive daytime sleepiness.  He attempted weight loss but was unsuccessful and began use of auto CPAP in 2024.  He has no complaints or concerns today.  He uses his CPAP nightly.  He does feel that his sleep is more restful and deeper with use of CPAP.  He no longer snores.  He presents to review compliance and effectiveness of treatment.    CPAP  ResMed air sense 10 set up in 2024  Pressure set at 4 to 15 cm H2O  Mask: Nasal pillow  DME provider: [employer] [facility]    Compliance  30/30 days, 100%  Greater than 4 hours 100%  Average session of 8 hours and 18 minutes nightly  Residual AHI of 1.7      Sitting and reading: Would never doze  Watching TV: Would never doze  Sitting, inactive in a public place (e.g. a theatre or a meeting): Would never doze  As a passenger in a car for an hour without a break: Would never doze  Lying down to rest in the afternoon when circumstances permit: Would never doze  Sitting and talking to someone: Would never doze  Sitting quietly after a lunch without alcohol: Would never doze  In a car, while stopped for a few minutes in traffic: Would never doze  Total  "score: 0     Review of Systems   Constitutional:  Negative for fatigue and unexpected weight change.   HENT:  Negative for congestion, nosebleeds and rhinorrhea.    Respiratory:  Negative for cough, shortness of breath and wheezing.    Cardiovascular:  Negative for palpitations and leg swelling.   Allergic/Immunologic: Negative for environmental allergies.   Neurological:  Negative for headaches.   Psychiatric/Behavioral:  Negative for decreased concentration. The patient is not nervous/anxious.      Pertinent positives/negatives included in HPI and also as noted:       Objective   /82 (BP Location: Left arm, Patient Position: Sitting, Cuff Size: Large)   Pulse 69   Ht 5' 11\" (1.803 m)   Wt 98 kg (216 lb)   SpO2 98%   BMI 30.13 kg/m²        Physical Exam  Vitals reviewed.   Constitutional:       General: He is not in acute distress.     Appearance: He is not ill-appearing.   Neurological:      Mental Status: He is alert.   Psychiatric:         Mood and Affect: Mood normal.         Data  Lab Results   Component Value Date    HGB 15.0 08/27/2024    HCT 46.4 08/27/2024    MCV 93 08/27/2024      Lab Results   Component Value Date    GLUCOSE 88 09/17/2015    CALCIUM 9.5 10/01/2024     09/17/2015    K 4.3 10/01/2024    CO2 31 10/01/2024     10/01/2024    BUN 19 10/01/2024    CREATININE 1.00 10/01/2024     Lab Results   Component Value Date    IRON 106 10/01/2024    TIBC 417 10/01/2024    FERRITIN 116.0 07/20/2023     Lab Results   Component Value Date    AST 18 08/27/2024    ALT 23 08/27/2024         "

## 2025-04-10 NOTE — PATIENT INSTRUCTIONS
Compliance data looks great.  Please continue to use your CPAP nightly.  Will follow-up with you in 1 year or sooner if you have any concerns.

## 2025-04-11 ENCOUNTER — TELEPHONE (OUTPATIENT)
Dept: SLEEP CENTER | Facility: CLINIC | Age: 60
End: 2025-04-11

## 2025-04-14 LAB

## 2025-04-18 NOTE — TELEPHONE ENCOUNTER
RX for Resupply sent to [facility] [facility].   
Rec'd call from Emily from [employer] asking if the RX resupply can be sent to [facility]  Fax [phone].  
intact

## 2025-05-02 ENCOUNTER — OFFICE VISIT (OUTPATIENT)
Dept: UROLOGY | Facility: MEDICAL CENTER | Age: 60
End: 2025-05-02
Payer: COMMERCIAL

## 2025-05-02 VITALS
HEART RATE: 78 BPM | WEIGHT: 210 LBS | SYSTOLIC BLOOD PRESSURE: 138 MMHG | OXYGEN SATURATION: 98 % | DIASTOLIC BLOOD PRESSURE: 80 MMHG | BODY MASS INDEX: 29.4 KG/M2 | HEIGHT: 71 IN

## 2025-05-02 DIAGNOSIS — R39.12 BENIGN PROSTATIC HYPERPLASIA WITH WEAK URINARY STREAM: Primary | ICD-10-CM

## 2025-05-02 DIAGNOSIS — N40.1 BENIGN PROSTATIC HYPERPLASIA WITH WEAK URINARY STREAM: Primary | ICD-10-CM

## 2025-05-02 DIAGNOSIS — Z12.5 SCREENING FOR PROSTATE CANCER: ICD-10-CM

## 2025-05-02 LAB — POST-VOID RESIDUAL VOLUME, ML POC: 104 ML

## 2025-05-02 PROCEDURE — 51798 US URINE CAPACITY MEASURE: CPT

## 2025-05-02 PROCEDURE — 99213 OFFICE O/P EST LOW 20 MIN: CPT

## 2025-05-02 RX ORDER — FINASTERIDE 5 MG/1
5 TABLET, FILM COATED ORAL DAILY
Qty: 90 TABLET | Refills: 3 | Status: SHIPPED | OUTPATIENT
Start: 2025-05-02

## 2025-05-02 RX ORDER — ALFUZOSIN HYDROCHLORIDE 10 MG/1
10 TABLET, EXTENDED RELEASE ORAL DAILY
Qty: 90 TABLET | Refills: 3 | Status: SHIPPED | OUTPATIENT
Start: 2025-05-02

## 2025-05-02 NOTE — PROGRESS NOTES
5/2/2025      Assessment and Plan    59 y.o. male managed by our office    Benign prostatic hyperplasia with weak urinary stream  PVR in the office today found to be 104 mL  Patient currently utilizing Flomax 0.4 mg and finasteride 5 mg daily.  Patient notes that his insurance is no longer covering the Cialis.  We discussed that further workup would include a in office cystoscopy.  Additionally, we discussed obtaining an ultrasound of the kidney and bladder to adequately sized the prostate.  Patient defers scheduling a cystoscopy at this time.  Patient will undergo an ultrasound of the kidney and bladder to size the prostate with the potential that we may pursue a an office cystoscopy.  Patient will return to the office in 6 months to reevaluate lower urinary tract symptoms    Screening for prostate cancer  Patient denies a family history of prostate cancer  Patient's most recent PSA was performed 8/27/2024 and found to be 2.247.  Refer to PSA trend below.  SEVERO performed at our last office visit was noted to be palpably benign.  Discussed that the patient will be due for repeat PSA in 3 months.  New PSA placed in the patient's chart.  Our office will call with PSA results.    Lab Results   Component Value Date    PSA 2.247 08/27/2024    PSA 1.8 02/10/2023    PSA 1.3 08/20/2021            History of Present Illness  Jose R Solano is a 59 y.o. male here for evaluation of BPH with obstruction/lower urinary tract symptoms and prostate cancer screening.  Patient was last seen in the office on 1/17/2025.    Patient has a significant past medical history for obstructive sleep apnea and erectile dysfunction.  Patient's erectile function is controlled on sildenafil 100 mg as needed 1 hour prior to sexual intercourse.      Initially, the patient reports a weakened urinary stream, feelings of incomplete bladder emptying, worsening urinary frequency, and postvoid dribbling.  Patient notes that he was trialed on Flomax back  in his early 40s and experienced side effects that he cannot remember, but discontinued the medication due to the side effects back then.  Additionally, the patient notes that he is unsure if the Flomax assisted with bettering his urination at that time.  Patient notes that he has a family history of an enlarged prostate, but denies a family history of prostate cancer.  Patient states that he believes his dad's prostate was sized around 300 g.  Patient was initiated on alfuzosin 10 mg once daily for treatment of his lower urinary tract symptoms at our last visit.  PVR in the office today was found to be 48 mL.     At our last visit, the patient reports that he has noted subtle improvement in his intermittent urinary stream since initiating therapy with the alfuzosin.  Patient notes that when he drinks caffeinated beverages, that he does have increased urinary frequency as well as increased episodes of the intermittent urinary stream.  Patient denies dysuria, hematuria, flank pain, or worsening urinary urgency.     Patient was initiated on triple therapy with alfuzosin, Cialis, and finasteride for treatment of his lower urinary tract symptoms.  PVR in the office today was 104 mL.    Today, the patient reports that he has been unable to use the Cialis as his insurance has not been covering the medication.  Patient has been continuing on the alfuzosin and finasteride.  Patient notes that his symptoms are not entirely changed since our last appointment, but is currently content with his voiding pattern.  Patient notes that occasionally when he laughs or sneezes he will notice that he gets an urge to have to go, but denies any incontinence.        Review of Systems   Constitutional:  Negative for chills and fever.   HENT:  Negative for ear pain and sore throat.    Eyes:  Negative for pain and visual disturbance.   Respiratory:  Negative for cough and shortness of breath.    Cardiovascular:  Negative for chest pain and  "palpitations.   Gastrointestinal:  Negative for abdominal pain and vomiting.   Genitourinary:  Positive for difficulty urinating. Negative for decreased urine volume, dysuria, flank pain, frequency, hematuria and urgency.   Musculoskeletal:  Negative for arthralgias and back pain.   Skin:  Negative for color change and rash.   Neurological:  Negative for seizures and syncope.   All other systems reviewed and are negative.               Vitals  Vitals:    05/02/25 1041   BP: 138/80   BP Location: Left arm   Patient Position: Sitting   Cuff Size: Large   Pulse: 78   SpO2: 98%   Weight: 95.3 kg (210 lb)   Height: 5' 11\" (1.803 m)       Physical Exam  Vitals reviewed.   Constitutional:       General: He is not in acute distress.     Appearance: Normal appearance. He is not ill-appearing.   HENT:      Head: Normocephalic and atraumatic.      Nose: Nose normal.   Eyes:      General: No scleral icterus.  Pulmonary:      Effort: No respiratory distress.   Abdominal:      General: Abdomen is flat. There is no distension.      Palpations: Abdomen is soft.      Tenderness: There is no abdominal tenderness.   Musculoskeletal:         General: Normal range of motion.      Cervical back: Normal range of motion.   Skin:     General: Skin is warm.      Coloration: Skin is not jaundiced.   Neurological:      Mental Status: He is alert and oriented to person, place, and time.      Gait: Gait normal.   Psychiatric:         Mood and Affect: Mood normal.         Behavior: Behavior normal.           Past History  Past Medical History:   Diagnosis Date    BPH (benign prostatic hyperplasia)     Disease of thyroid gland     Retinitis pigmentosa     Testosterone deficiency      Social History     Socioeconomic History    Marital status: /Civil Union     Spouse name: None    Number of children: None    Years of education: None    Highest education level: None   Occupational History    None   Tobacco Use    Smoking status: Never     " Passive exposure: Never    Smokeless tobacco: Never   Vaping Use    Vaping status: Never Used   Substance and Sexual Activity    Alcohol use: Yes     Alcohol/week: 3.0 standard drinks of alcohol     Types: 3 Standard drinks or equivalent per week     Comment: casual    Drug use: Never    Sexual activity: Yes     Partners: Female     Birth control/protection: Male Sterilization   Other Topics Concern    None   Social History Narrative    None     Social Drivers of Health     Financial Resource Strain: Low Risk  (2/10/2023)    Overall Financial Resource Strain (CARDIA)     Difficulty of Paying Living Expenses: Not hard at all   Food Insecurity: No Food Insecurity (2/10/2023)    Hunger Vital Sign     Worried About Running Out of Food in the Last Year: Never true     Ran Out of Food in the Last Year: Never true   Transportation Needs: No Transportation Needs (2/10/2023)    PRAPARE - Transportation     Lack of Transportation (Medical): No     Lack of Transportation (Non-Medical): No   Physical Activity: Sufficiently Active (2/10/2023)    Exercise Vital Sign     Days of Exercise per Week: 6 days     Minutes of Exercise per Session: 40 min   Stress: No Stress Concern Present (2/10/2023)    [employer] - Occupational Stress Questionnaire     Feeling of Stress : Not at all   Social Connections: Unknown (6/18/2024)    Received from [employer]    Social [employer]     How often do you feel lonely or isolated from those around you? (Adult - for ages 18 years and over): Not on file   Intimate Partner Violence: Not At Risk (2/10/2023)    Humiliation, Afraid, Rape, and Kick questionnaire     Fear of Current or Ex-Partner: No     Emotionally Abused: No     Physically Abused: No     Sexually Abused: No   Housing Stability: Unknown (2/10/2023)    Housing Stability Vital Sign     Unable to Pay for Housing in the Last Year: No     Number of Places Lived in the Last Year: Not on file     Unstable Housing in  the Last Year: No     Social History     Tobacco Use   Smoking Status Never    Passive exposure: Never   Smokeless Tobacco Never     Family History   Problem Relation Age of Onset    Hypertension Family     Other Family     Heart disease Mother     Dementia Mother     Hypertension Mother     Arthritis Mother     Vision loss Mother         Retinitis pigmentosa    Cancer Father         Skin cancer    Dementia Father     Hypertension Father     Diabetes Father         Type 2       The following portions of the patient's history were reviewed and updated as appropriate: allergies, current medications, past medical history, past social history, past surgical history and problem list.    Results  No results found for this or any previous visit (from the past hour).]  Lab Results   Component Value Date    PSA 2.247 08/27/2024    PSA 1.8 02/10/2023    PSA 1.3 08/20/2021    PSA 1.2 10/28/2019     Lab Results   Component Value Date    GLUCOSE 88 09/17/2015    CALCIUM 9.5 10/01/2024     09/17/2015    K 4.3 10/01/2024    CO2 31 10/01/2024     10/01/2024    BUN 19 10/01/2024    CREATININE 1.00 10/01/2024     Lab Results   Component Value Date    WBC 4.19 (L) 08/27/2024    HGB 15.0 08/27/2024    HCT 46.4 08/27/2024    MCV 93 08/27/2024     08/27/2024

## 2025-05-02 NOTE — ASSESSMENT & PLAN NOTE
PVR in the office today found to be 104 mL  Patient currently utilizing Flomax 0.4 mg and finasteride 5 mg daily.  Patient notes that his insurance is no longer covering the Cialis.  We discussed that further workup would include a in office cystoscopy.  Additionally, we discussed obtaining an ultrasound of the kidney and bladder to adequately sized the prostate.  Patient defers scheduling a cystoscopy at this time.  Patient will undergo an ultrasound of the kidney and bladder to size the prostate with the potential that we may pursue a an office cystoscopy.  Patient will return to the office in 6 months to reevaluate lower urinary tract symptoms

## 2025-05-02 NOTE — ASSESSMENT & PLAN NOTE
Patient denies a family history of prostate cancer  Patient's most recent PSA was performed 8/27/2024 and found to be 2.247.  Refer to PSA trend below.  SEVERO performed at our last office visit was noted to be palpably benign.  Discussed that the patient will be due for repeat PSA in 3 months.  New PSA placed in the patient's chart.  Our office will call with PSA results.    Lab Results   Component Value Date    PSA 2.247 08/27/2024    PSA 1.8 02/10/2023    PSA 1.3 08/20/2021

## 2025-05-20 ENCOUNTER — TELEPHONE (OUTPATIENT)
Age: 60
End: 2025-05-20

## 2025-05-20 NOTE — TELEPHONE ENCOUNTER
[facility] is following up on the request for patient's CPAP supplies. They said that [facility] has not received the order (refer to telephone encounter 04/11/25). Please fax order for CPAP supplies to:  [phone]    Note, the fax number typed in telephone encounter 04/11/25 is incorrect by one digit. The order likely did not go through.

## 2025-05-27 ENCOUNTER — HOSPITAL ENCOUNTER (OUTPATIENT)
Dept: ULTRASOUND IMAGING | Facility: MEDICAL CENTER | Age: 60
Discharge: HOME | End: 2025-05-27
Payer: COMMERCIAL

## 2025-05-27 DIAGNOSIS — N40.1 BENIGN PROSTATIC HYPERPLASIA WITH WEAK URINARY STREAM: ICD-10-CM

## 2025-05-27 DIAGNOSIS — R39.12 BENIGN PROSTATIC HYPERPLASIA WITH WEAK URINARY STREAM: ICD-10-CM

## 2025-05-27 PROCEDURE — 76775 US EXAM ABDO BACK WALL LIM: CPT

## 2025-06-03 ENCOUNTER — RESULTS FOLLOW-UP (OUTPATIENT)
Dept: UROLOGY | Facility: AMBULATORY SURGERY CENTER | Age: 60
End: 2025-06-03

## 2025-06-03 NOTE — TELEPHONE ENCOUNTER
LVM per communication consent with the below message from KRISHAN Forrest. Phone number for urology left on message.     ----- Message from Lon Flores PA-C sent at 6/3/2025  1:24 PM EDT -----  Please call the patient advise him I reviewed his most recent ultrasound.  Normal kidneys bilaterally and unremarkable bladder.  Patient's prostate was measured at 76 mL.  Normal prostate size ranges   between 20 to 40 mL.  Mild prostatic megaly noted.  ----- Message -----  From: Interface, Radiology Results In  Sent: 6/3/2025   1:13 PM EDT  To: Lon Flores PA-C